# Patient Record
Sex: MALE | Race: WHITE | NOT HISPANIC OR LATINO | Employment: OTHER | URBAN - METROPOLITAN AREA
[De-identification: names, ages, dates, MRNs, and addresses within clinical notes are randomized per-mention and may not be internally consistent; named-entity substitution may affect disease eponyms.]

---

## 2018-05-07 ENCOUNTER — OFFICE VISIT (OUTPATIENT)
Dept: SURGERY | Facility: CLINIC | Age: 58
End: 2018-05-07
Payer: COMMERCIAL

## 2018-05-07 ENCOUNTER — TELEPHONE (OUTPATIENT)
Dept: ENDOSCOPY | Facility: HOSPITAL | Age: 58
End: 2018-05-07

## 2018-05-07 VITALS
HEART RATE: 69 BPM | BODY MASS INDEX: 33.54 KG/M2 | SYSTOLIC BLOOD PRESSURE: 135 MMHG | HEIGHT: 69 IN | WEIGHT: 226.44 LBS | DIASTOLIC BLOOD PRESSURE: 81 MMHG

## 2018-05-07 DIAGNOSIS — Z86.010 HISTORY OF COLON POLYPS: Primary | ICD-10-CM

## 2018-05-07 DIAGNOSIS — K64.8 INTERNAL HEMORRHOIDS WITH COMPLICATION: ICD-10-CM

## 2018-05-07 PROCEDURE — 99203 OFFICE O/P NEW LOW 30 MIN: CPT | Mod: 25,S$GLB,, | Performed by: COLON & RECTAL SURGERY

## 2018-05-07 PROCEDURE — 99999 PR PBB SHADOW E&M-EST. PATIENT-LVL III: CPT | Mod: PBBFAC,,, | Performed by: COLON & RECTAL SURGERY

## 2018-05-07 PROCEDURE — 3008F BODY MASS INDEX DOCD: CPT | Mod: CPTII,S$GLB,, | Performed by: COLON & RECTAL SURGERY

## 2018-05-07 RX ORDER — METOPROLOL SUCCINATE 25 MG/1
TABLET, EXTENDED RELEASE ORAL
COMMUNITY
End: 2018-05-07

## 2018-05-07 RX ORDER — METHYLPREDNISOLONE 4 MG/1
TABLET ORAL
COMMUNITY
End: 2018-08-30

## 2018-05-07 RX ORDER — FLECAINIDE ACETATE 50 MG/1
TABLET ORAL
COMMUNITY
End: 2018-05-07

## 2018-05-07 RX ORDER — TOBRAMYCIN AND DEXAMETHASONE 3; 1 MG/ML; MG/ML
SUSPENSION/ DROPS OPHTHALMIC
Refills: 0 | COMMUNITY
Start: 2018-02-12 | End: 2019-07-02

## 2018-05-08 ENCOUNTER — TELEPHONE (OUTPATIENT)
Dept: ENDOSCOPY | Facility: HOSPITAL | Age: 58
End: 2018-05-08

## 2018-05-08 DIAGNOSIS — Z12.11 SPECIAL SCREENING FOR MALIGNANT NEOPLASMS, COLON: Primary | ICD-10-CM

## 2018-05-08 RX ORDER — SODIUM, POTASSIUM,MAG SULFATES 17.5-3.13G
1 SOLUTION, RECONSTITUTED, ORAL ORAL ONCE
Qty: 1 BOTTLE | Refills: 0 | Status: SHIPPED | OUTPATIENT
Start: 2018-05-08 | End: 2018-05-08

## 2018-05-08 RX ORDER — FLECAINIDE ACETATE 50 MG/1
50 TABLET ORAL EVERY 12 HOURS
COMMUNITY
End: 2019-07-02

## 2018-05-08 NOTE — TELEPHONE ENCOUNTER
Patient called and rescheduled colonoscopy on 7/5/18 @8:30am. Suprep instructions reviewed with patient. Patient verbalized understanding. Written instructions e-mailed to [email or web address] per patient request.

## 2018-05-08 NOTE — PROGRESS NOTES
Subjective:       Patient ID: Gonzalez Lockhart is a 57 y.o. male.    Chief Complaint: Hemorrhoids and Rectal Pain    HPI New pt.  Intermittent hemorrhoidal symptoms of prolapse and bleeding.  Here to discuss treatment options.  Last cscope 2012 - adenoma.    Inconsistent fiber.     Past Medical History:   Diagnosis Date    A-fib     Vitamin D deficiency      History reviewed. RUBA, 2007 - c/b abscess requiring drainage and curettage.     Review of Systems   Constitutional: Negative for chills and fever.   Respiratory: Negative for cough and shortness of breath.    Cardiovascular: Negative for chest pain and palpitations.   Gastrointestinal: Negative for nausea and vomiting.   Genitourinary: Negative for dysuria and urgency.   Neurological: Negative for seizures and numbness.       Objective:      Physical Exam   Constitutional: He is oriented to person, place, and time. He appears well-developed and well-nourished.   Eyes: Conjunctivae and EOM are normal.   Pulmonary/Chest: Effort normal. No respiratory distress.   Genitourinary:   Genitourinary Comments: Anorectal Exam:     Perianal skin: normal    CARINE: Normal resting and squeeze tone.  No masses. Nontender. Prostate normal.     Anoscopy:  Normal distal rectal mucosa. Internal hemorrhoids with stigmata of bleeding or prolapse.      Musculoskeletal: Normal range of motion. He exhibits no edema.   Neurological: He is alert and oriented to person, place, and time.   Skin: Skin is warm and dry.       Assessment:       1. History of colon polyps    2. Internal hemorrhoids with complication        Plan:       Colonoscopy for history of adenoma.   RBL for internal hemorrhoids with complications.

## 2018-06-08 ENCOUNTER — TELEPHONE (OUTPATIENT)
Dept: ENDOSCOPY | Facility: HOSPITAL | Age: 58
End: 2018-06-08

## 2018-06-08 NOTE — TELEPHONE ENCOUNTER
Called patient regarding upcoming procedure. No answer. Left voicemail with direct number to call back.

## 2018-06-25 DIAGNOSIS — Z12.11 SPECIAL SCREENING FOR MALIGNANT NEOPLASMS, COLON: Primary | ICD-10-CM

## 2018-06-25 RX ORDER — SODIUM, POTASSIUM,MAG SULFATES 17.5-3.13G
1 SOLUTION, RECONSTITUTED, ORAL ORAL ONCE
Qty: 1 BOTTLE | Refills: 0 | Status: SHIPPED | OUTPATIENT
Start: 2018-06-25 | End: 2018-06-25

## 2018-07-05 ENCOUNTER — ANESTHESIA (OUTPATIENT)
Dept: ENDOSCOPY | Facility: HOSPITAL | Age: 58
End: 2018-07-05
Payer: COMMERCIAL

## 2018-07-05 ENCOUNTER — ANESTHESIA EVENT (OUTPATIENT)
Dept: ENDOSCOPY | Facility: HOSPITAL | Age: 58
End: 2018-07-05
Payer: COMMERCIAL

## 2018-07-05 ENCOUNTER — SURGERY (OUTPATIENT)
Age: 58
End: 2018-07-05

## 2018-07-05 ENCOUNTER — HOSPITAL ENCOUNTER (OUTPATIENT)
Facility: HOSPITAL | Age: 58
Discharge: HOME | End: 2018-07-05
Attending: COLON & RECTAL SURGERY | Admitting: COLON & RECTAL SURGERY
Payer: COMMERCIAL

## 2018-07-05 VITALS
HEART RATE: 55 BPM | TEMPERATURE: 98 F | WEIGHT: 223 LBS | OXYGEN SATURATION: 96 % | HEIGHT: 69 IN | RESPIRATION RATE: 20 BRPM | SYSTOLIC BLOOD PRESSURE: 115 MMHG | DIASTOLIC BLOOD PRESSURE: 72 MMHG | BODY MASS INDEX: 33.03 KG/M2

## 2018-07-05 DIAGNOSIS — Z12.11 SCREENING FOR COLON CANCER: ICD-10-CM

## 2018-07-05 DIAGNOSIS — Z86.010 HISTORY OF COLON POLYPS: Primary | ICD-10-CM

## 2018-07-05 PROBLEM — Z86.0100 HISTORY OF COLON POLYPS: Status: ACTIVE | Noted: 2018-07-05

## 2018-07-05 PROCEDURE — 63600175 PHARM REV CODE 636 W HCPCS: Performed by: NURSE ANESTHETIST, CERTIFIED REGISTERED

## 2018-07-05 PROCEDURE — 37000009 HC ANESTHESIA EA ADD 15 MINS: Performed by: COLON & RECTAL SURGERY

## 2018-07-05 PROCEDURE — D9220A PRA ANESTHESIA: Mod: ANES,,, | Performed by: ANESTHESIOLOGY

## 2018-07-05 PROCEDURE — 45385 COLONOSCOPY W/LESION REMOVAL: CPT | Mod: ,,, | Performed by: COLON & RECTAL SURGERY

## 2018-07-05 PROCEDURE — D9220A PRA ANESTHESIA: Mod: CRNA,,, | Performed by: NURSE ANESTHETIST, CERTIFIED REGISTERED

## 2018-07-05 PROCEDURE — 25000003 PHARM REV CODE 250: Performed by: NURSE PRACTITIONER

## 2018-07-05 PROCEDURE — 45385 COLONOSCOPY W/LESION REMOVAL: CPT | Performed by: COLON & RECTAL SURGERY

## 2018-07-05 PROCEDURE — 88305 TISSUE EXAM BY PATHOLOGIST: CPT | Performed by: PATHOLOGY

## 2018-07-05 PROCEDURE — 88305 TISSUE EXAM BY PATHOLOGIST: CPT | Mod: 26,,, | Performed by: PATHOLOGY

## 2018-07-05 PROCEDURE — 25000003 PHARM REV CODE 250: Performed by: COLON & RECTAL SURGERY

## 2018-07-05 PROCEDURE — 46221 LIGATION OF HEMORRHOID(S): CPT | Mod: 51,,, | Performed by: COLON & RECTAL SURGERY

## 2018-07-05 PROCEDURE — 45398 COLONOSCOPY W/BAND LIGATION: CPT | Performed by: COLON & RECTAL SURGERY

## 2018-07-05 PROCEDURE — 46221 LIGATION OF HEMORRHOID(S): CPT | Performed by: COLON & RECTAL SURGERY

## 2018-07-05 PROCEDURE — 27201089 HC SNARE, DISP (ANY): Performed by: COLON & RECTAL SURGERY

## 2018-07-05 PROCEDURE — 37000008 HC ANESTHESIA 1ST 15 MINUTES: Performed by: COLON & RECTAL SURGERY

## 2018-07-05 PROCEDURE — 27201022: Performed by: COLON & RECTAL SURGERY

## 2018-07-05 RX ORDER — SODIUM CHLORIDE 9 MG/ML
INJECTION, SOLUTION INTRAVENOUS CONTINUOUS
Status: DISCONTINUED | OUTPATIENT
Start: 2018-07-05 | End: 2018-07-05 | Stop reason: HOSPADM

## 2018-07-05 RX ORDER — BUPIVACAINE HYDROCHLORIDE 2.5 MG/ML
INJECTION, SOLUTION EPIDURAL; INFILTRATION; INTRACAUDAL
Status: COMPLETED | OUTPATIENT
Start: 2018-07-05 | End: 2018-07-05

## 2018-07-05 RX ORDER — PROPOFOL 10 MG/ML
VIAL (ML) INTRAVENOUS
Status: DISCONTINUED | OUTPATIENT
Start: 2018-07-05 | End: 2018-07-05

## 2018-07-05 RX ORDER — PROPOFOL 10 MG/ML
VIAL (ML) INTRAVENOUS CONTINUOUS PRN
Status: DISCONTINUED | OUTPATIENT
Start: 2018-07-05 | End: 2018-07-05

## 2018-07-05 RX ORDER — LIDOCAINE HCL/PF 100 MG/5ML
SYRINGE (ML) INTRAVENOUS
Status: DISCONTINUED | OUTPATIENT
Start: 2018-07-05 | End: 2018-07-05

## 2018-07-05 RX ADMIN — PROPOFOL 80 MG: 10 INJECTION, EMULSION INTRAVENOUS at 08:07

## 2018-07-05 RX ADMIN — PROPOFOL 200 MCG/KG/MIN: 10 INJECTION, EMULSION INTRAVENOUS at 08:07

## 2018-07-05 RX ADMIN — BUPIVACAINE HYDROCHLORIDE 10 ML: 2.5 INJECTION, SOLUTION EPIDURAL; INFILTRATION; INTRACAUDAL; PERINEURAL at 09:07

## 2018-07-05 RX ADMIN — BUPIVACAINE HYDROCHLORIDE 5 ML: 2.5 INJECTION, SOLUTION EPIDURAL; INFILTRATION; INTRACAUDAL; PERINEURAL at 09:07

## 2018-07-05 RX ADMIN — SODIUM CHLORIDE: 0.9 INJECTION, SOLUTION INTRAVENOUS at 07:07

## 2018-07-05 RX ADMIN — LIDOCAINE HYDROCHLORIDE 50 MG: 20 INJECTION, SOLUTION INTRAVENOUS at 08:07

## 2018-07-05 NOTE — H&P
Endoscopy H&P    Procedure : Colonoscopy      asymptomatic screening exam, internal hemorrhoids, personal history of colon polyps and most recent endoscopic exam 6 years ago      Past Medical History:   Diagnosis Date    A-fib     Vitamin D deficiency      Sedation Problems: NO  History reviewed. No pertinent family history.  Fam Hx of Sedation Problems: NO  Social History     Social History    Marital status:      Spouse name: N/A    Number of children: N/A    Years of education: N/A     Occupational History    Not on file.     Social History Main Topics    Smoking status: Never Smoker    Smokeless tobacco: Never Used    Alcohol use Yes      Comment: Rarely    Drug use: No    Sexual activity: Yes     Partners: Female     Other Topics Concern    Not on file     Social History Narrative    No narrative on file       Review of Systems - Negative except     Respiratory ROS: no cough, shortness of breath, or wheezing  Cardiovascular ROS: no chest pain or dyspnea on exertion  Gastrointestinal ROS: no abdominal pain, change in bowel habits, or black or bloody stools  Musculoskeletal ROS: negative  Neurological ROS: no TIA or stroke symptoms        Physical Exam:  General: well developed, no distress  Head: normocephalic, atraumatic  Airway:  normal oropharynx, airway normal  Neck: supple, symmetrical, trachea midline  Lungs:  clear to auscultation bilaterally and normal respiratory effort  Heart: regular rate and rhythm, S1, S2 normal, no murmur, rub or gallop  Abdomen: soft, non-tender non-distented; bowel sounds normal; no masses,  no organomegaly  Extremities: warm, well perfused       Deep Sedation: Mallampati Score per anesthesia     SedationPlan :Choice     ASA : II

## 2018-07-05 NOTE — DISCHARGE INSTRUCTIONS
Diverticulosis    Diverticulosis means that small pouches have formed in the wall of your large intestine (colon). Most often, this problem causes no symptoms and is common as people age. But the pouches in the colon are at risk of becoming infected. When this happens, the condition is called diverticulitis. Although most people with diverticulosis never develop diverticulitis, it is still not uncommon. Rectal bleeding can also occur and in less common situations, a type of colon inflammation called colitis.  While most people do not have symptoms, some people with diverticulosis may have:  · Abdominal cramps and pain  · Bloating  · Constipation  · Change in bowel habits  Causes  The exact cause of diverticulosis (and diverticulitis) has not been proved, but a few things are associated with the condition:  · Low-fiber diet  · Constipation  · Lack of exercise  Your healthcare provider will talk with you about how to manage your condition. Diet changes may be all that are needed to help control diverticulosis and prevent progression to diverticulitis. If you develop diverticulitis, you will likely need other treatments.  Home care  You may be told to take fiber supplements daily. Fiber adds bulk to the stool so that it passes through the colon more easily. Stool softeners may be recommended. You may also be given medications for pain relief. Be sure to take all medications as directed.  In the past, people were told to avoid corn, nuts, and seeds. This is no longer necessary.  Follow these guidelines when caring for yourself at home:  · Eat unprocessed foods that are high in fiber. Whole grains, fruits, and vegetables are good choices.  · Drink 6 to 8 glasses of water every day unless your healthcare provider has you limit how much fluid you should have.  · Watch for changes in your bowel movements. Tell your provider if you notice any changes.  · Begin an exercise program. Ask your provider how to get started.  Generally, walking is the best.  · Get plenty of rest and sleep.  Follow-up care  Follow up with your healthcare provider, or as advised. Regular visits may be needed to check on your health. Sometimes special procedures such as colonoscopy, are needed after an episode of diverticulitis or blooding. Be sure to keep all your appointments.  If a stool sample was taken, or cultures were done, you should be told if they are positive, or if your treatment needs to be changed. You can call as directed for the results.  If X-rays were done, a radiologist will look at them. You will be told if there is a change in your treatment.  If antibiotics were prescribed, be sure to finish them all.  When to seek medical advice  Call your healthcare provider right away if any of these occur:  · Fever of 100.4°F (38°C) or higher, or as directed by your healthcare provider  · Severe cramps in the lower left side of the abdomen or pain that is getting worse  · Tenderness in the lower left side of the abdomen or worsening pain throughout the abdomen  · Diarrhea or constipation that doesn't get better within 24 hours  · Nausea and vomiting  · Bleeding from the rectum  Call 911  Call emergency services if any of the following occur:  · Trouble breathing  · Confusion  · Very drowsy or trouble awakening  · Fainting or loss of consciousness  · Rapid heart rate  · Chest pain  Date Last Reviewed: 12/30/2015 © 2000-2017 [employer]. [address] [place] [address]. All rights reserved. This information is not intended as a substitute for professional medical care. Always follow your healthcare professional's instructions.        Understanding Colon and Rectal Polyps    The colon (also called the large intestine) is a muscular tube that forms the last part of the digestive tract. It absorbs water and stores food waste. The colon is about 4 to 6 feet long. The rectum is the last 6 inches of the colon. The colon and rectum  have a smooth lining composed of millions of cells. Changes in these cells can lead to growths in the colon that can become cancerous and should be removed. Multiple tests are available to screen for colon cancer, but the colonoscopy is the most recommended test. During colonoscopy, these polyps can be removed. How often you need this test depends on many things including your condition, your family history, symptoms, and what the findings were at the previous colonoscopy.   When the colon lining changes  Changes that happen in the cells that line the colon or rectum can lead to growths called polyps. Over a period of years, polyps can turn cancerous. Removing polyps early may prevent cancer from ever forming.  Polyps  Polyps are fleshy clumps of tissue that form on the lining of the colon or rectum. Small polyps are usually benign (not cancerous). However, over time, cells in a polyp can change and become cancerous. Certain types of polyps known as adenomatous polyps are premalignant. The risk for invasive cancer increases with the size of the polyp and certain cell and gene features. This means that they can become cancerous if they're not removed. Hyperplastic polyps are benign. They can grow quite large and not turn cancerous.   Cancer  Almost all colorectal cancers start when polyp cells begin growing abnormally. As a cancerous tumor grows, it may involve more and more of the colon or rectum. In time, cancer can also grow beyond the colon or rectum and spread to nearby organs or to glands called lymph nodes. The cells can also travel to other parts of the body. This is known as metastasis. The earlier a cancerous tumor is removed, the better the chance of preventing its spread.    Date Last Reviewed: 8/1/2016  © 8452-1735 [employer]. [address] [place] [address], [place] [address]. All rights reserved. This information is not intended as a substitute for professional medical care. Always follow your  healthcare professional's instructions.        Eating a High-Fiber Diet  Fiber is what gives strength and structure to plants. Most grains, beans, vegetables, and fruits contain fiber. Foods rich in fiber are often low in calories and fat, and they fill you up more. They may also reduce your risks for certain health problems. To find out the amount of fiber in canned, packaged, or frozen foods, read the Nutrition Facts label. It tells you how much fiber is in a serving.    Types of fiber and their benefits  There are two types of fiber: insoluble and soluble. They both aid digestion and help you maintain a healthy weight.  · Insoluble fiber. This is found in whole grains, cereals, certain fruits and vegetables such as apple skin, corn, and carrots. Insoluble fiber may prevent constipation and reduce the risk for certain types of cancer.  · Soluble fiber. This type of fiber is in oats, beans, and certain fruits and vegetables such as strawberries and peas. Soluble fiber can reduce cholesterol, which may help lower the risk for heart disease. It also helps control blood sugar levels.  Look for high-fiber foods  Try these foods to add fiber to your diet:  · Whole-grain breads and cereals. Try to eat 6 to 8 ounces a day. Include wheat and oat bran cereals, whole-wheat muffins or toast, and corn tortillas in your meals.  · Fruits. Try to eat 2 cups a day. Apples, oranges, strawberries, pears, and bananas are good sources. (Note: Fruit juice is low in fiber.)  · Vegetables. Try to eat at least 2.5 cups a day. Add asparagus, carrots, broccoli, peas, and corn to your meals.  · Beans. One cup of cooked lentils gives you over 15 grams of fiber. Try navy beans, lentils, and chickpeas.  · Seeds. A small handful of seeds gives you about 3 grams of fiber. Try sunflower seeds.  Keep track of your fiber  Keep track of how much fiber you eat. Start by reading food labels. Then eat a variety of foods high in fiber. As you begin to  eat more fiber, ask your healthcare provider how much water you should be drinking to keep your digestive system working smoothly.  You should aim for a certain amount of fiber in your diet each day. If you are a woman, that amount is between 25 and 28 grams per day. Men should aim for 30 to 33 grams per day. After age 50, your daily fiber needs drop to 22 grams for women and 28 grams for men.  Before you reach for the fiber supplements, think about this. Fiber is found naturally in healthy whole foods. It gives you that feeling of fullness after you eat. Taking fiber supplements or eating fiber-enriched foods will not give you this full feeling.  Your fiber intake is a good measure for the quality of your overall diet. If you are missing out on your daily amount of fiber, you may be lacking other important nutrients as well.  Date Last Reviewed: 5/11/2015  © 0927-2187 [employer]. [address] [place] [address]. All rights reserved. This information is not intended as a substitute for professional medical care. Always follow your healthcare professional's instructions.        Colonoscopy     A camera attached to a flexible tube with a viewing lens is used to take video pictures.     Colonoscopy is a test to view the inside of your lower digestive tract (colon and rectum). Sometimes it can show the last part of the small intestine (ileum). During the test, small pieces of tissue may be removed for testing. This is called a biopsy. Small growths, such as polyps, may also be removed.   Why is colonoscopy done?  The test is done to help look for colon cancer. And it can help find the source of abdominal pain, bleeding, and changes in bowel habits. It may be needed once a year, depending on factors such as your:  · Age  · Health history  · Family health history  · Symptoms  · Results from any prior colonoscopy  Risks and possible complications  These include:  · Bleeding               · A  puncture or tear in the colon   · Risks of anesthesia  · A cancer lesion not being seen  Getting ready   To prepare for the test:  · Talk with your healthcare provider about the risks of the test (see below). Also ask your healthcare provider about alternatives to the test.  · Tell your healthcare provider about any medicines you take. Also tell him or her about any health conditions you may have.  · Make sure your rectum and colon are empty for the test. Follow the diet and bowel prep instructions exactly. If you dont, the test may need to be rescheduled.  · Plan for a friend or family member to drive you home after the test.     Colonoscopy provides an inside view of the entire colon.     You may discuss the results with your doctor right away or at a future visit.  During the test   The test is usually done in the hospital on an outpatient basis. This means you go home the same day. The procedure takes about 30 minutes. During that time:  · You are given relaxing (sedating) medicine through an IV line. You may be drowsy, or fully asleep.  · The healthcare provider will first give you a physical exam to check for anal and rectal problems.  · Then the anus is lubricated and the scope inserted.  · If you are awake, you may have a feeling similar to needing to have a bowel movement. You may also feel pressure as air is pumped into the colon. Its OK to pass gas during the procedure.  · Biopsy, polyp removal, or other treatments may be done during the test.  After the test   You may have gas right after the test. It can help to try to pass it to help prevent later bloating. Your healthcare provider may discuss the results with you right away. Or you may need to schedule a follow-up visit to talk about the results. After the test, you can go back to your normal eating and other activities. You may be tired from the sedation and need to rest for a few hours.  Date Last Reviewed: 11/1/2016  © 8195-4037 [employer]. [address] [place] [address], [place] [address]. All rights reserved. This information is not intended as a substitute for professional medical care. Always follow your healthcare professional's instructions.

## 2018-07-05 NOTE — ANESTHESIA PREPROCEDURE EVALUATION
07/05/2018  Gonzalez Lockhart is a 57 y.o., male.    Anesthesia Evaluation    I have reviewed the Patient Summary Reports.     I have reviewed the Medications.     Review of Systems  Anesthesia Hx:  No problems with previous Anesthesia Denies Hx of Anesthetic complications  Neg history of prior surgery. Denies Family Hx of Anesthesia complications.   Denies Personal Hx of Anesthesia complications.   Social:  Non-Smoker    Hematology/Oncology:  Hematology Normal   Oncology Normal     EENT/Dental:EENT/Dental Normal   Cardiovascular:  Cardiovascular Normal Exercise tolerance: good  ECG has been reviewed. Hx Afib   Pulmonary:  Pulmonary Normal    Renal/:  Renal/ Normal     Hepatic/GI:   GERD    Musculoskeletal:  Musculoskeletal Normal    Neurological:  Neurology Normal    Endocrine:  Endocrine Normal    Dermatological:  Skin Normal    Psych:  Psychiatric Normal           Physical Exam  General:  Well nourished    Airway/Jaw/Neck:  Airway Findings: Mouth Opening: Normal Tongue: Normal  General Airway Assessment: Adult  Mallampati: II  TM Distance: Normal, at least 6 cm  Jaw/Neck Findings:  Micrognathia: Negative Mandibular Fracture: Negative    Neck ROM: Normal ROM  Neck Findings: Normal    Eyes/Ears/Nose:  EYES/EARS/NOSE FINDINGS: Normal   Dental:  Dental Findings: In tact   Chest/Lungs:  Chest/Lungs Findings: Clear to auscultation, Normal Respiratory Rate     Heart/Vascular:  Heart Findings: Rate: Normal  Rhythm: Regular Rhythm  Sounds: Normal  Heart murmur: negative Vascular Findings: Normal    Abdomen:  Abdomen Findings:  Normal, Soft, Nontender     Musculoskeletal:  Musculoskeletal Findings: Normal   Skin:  Skin Findings: Normal    Mental Status:  Mental Status Findings:  Cooperative, Alert and Oriented         Anesthesia Plan  Type of Anesthesia, risks & benefits discussed:  Anesthesia Type:   general  Patient's Preference:   Intra-op Monitoring Plan: standard ASA monitors  Intra-op Monitoring Plan Comments:   Post Op Pain Control Plan:   Post Op Pain Control Plan Comments:   Induction:   IV  Beta Blocker:  Patient is on a Beta-Blocker and has received one dose within the past 24 hours (No further documentation required).       Informed Consent: Patient understands risks and agrees with Anesthesia plan.  Questions answered. Anesthesia consent signed with patient.  ASA Score: 2     Day of Surgery Review of History & Physical:    H&P update referred to the provider.         Ready For Surgery From Anesthesia Perspective.

## 2018-07-05 NOTE — ANESTHESIA POSTPROCEDURE EVALUATION
"Anesthesia Post Evaluation    Patient: Gonzalez Lockhart    Procedure(s) Performed: Procedure(s) (LRB):  COLONOSCOPY; rubber band ligation of hemorrhoids (N/A)    Final Anesthesia Type: general  Patient location during evaluation: GI PACU  Patient participation: Yes- Able to Participate  Level of consciousness: awake and alert  Post-procedure vital signs: reviewed and stable  Pain management: adequate  Airway patency: patent  PONV status at discharge: No PONV  Anesthetic complications: no      Cardiovascular status: blood pressure returned to baseline  Respiratory status: unassisted  Hydration status: euvolemic  Follow-up not needed.        Visit Vitals  /72   Pulse (!) 55   Temp 36.4 °C (97.5 °F)   Resp 20   Ht 5' 9" (1.753 m)   Wt 101.2 kg (223 lb)   SpO2 96%   BMI 32.93 kg/m²       Pain/Mckenna Score: Pain Assessment Performed: Yes (7/5/2018  9:50 AM)  Presence of Pain: denies (7/5/2018  9:50 AM)  Mckenna Score: 10 (7/5/2018  9:50 AM)      "

## 2018-07-05 NOTE — [employer] PATIENT INSTRUCTIONS
Discharge Summary/Instructions after an Endoscopic Procedure  Patient Name: Gonzalez Lockhart  Patient MRN: [id number]  Patient YOB: 1960 Thursday, July 05, 2018  Kennedy Marr MD  RESTRICTIONS:  During your procedure today, you received medications for sedation.  These   medications may affect your judgment, balance and coordination.  Therefore,   for 24 hours, you have the following restrictions:   - DO NOT drive a car, operate machinery, make legal/financial decisions,   sign important papers or drink alcohol.    ACTIVITY:  Today: no heavy lifting, straining or running due to procedural   sedation/anesthesia.  The following day: return to full activity including work.  DIET:  Eat and drink normally unless instructed otherwise.     TREATMENT FOR COMMON SIDE EFFECTS:  - Mild abdominal pain, nausea, belching, bloating or excessive gas:  rest,   eat lightly and use a heating pad.  - Sore Throat: treat with throat lozenges and/or gargle with warm salt   water.  - Because air was used during the procedure, expelling large amounts of air   from your rectum or belching is normal.  - If a bowel prep was taken, you may not have a bowel movement for 1-3 days.    This is normal.  SYMPTOMS TO WATCH FOR AND REPORT TO YOUR PHYSICIAN:  1. Abdominal pain or bloating, other than gas cramps.  2. Chest pain.  3. Back pain.  4. Signs of infection such as: chills or fever occurring within 24 hours   after the procedure.  5. Rectal bleeding, which would show as bright red, maroon, or black stools.   (A tablespoon of blood from the rectum is not serious, especially if   hemorrhoids are present.)  6. Vomiting.  7. Weakness or dizziness.  GO DIRECTLY TO THE NEAREST EMERGENCY ROOM IF YOU HAVE ANY OF THE FOLLOWING:      Difficulty breathing              Chills and/or fever over 101 F   Persistent vomiting and/or vomiting blood   Severe abdominal pain   Severe chest pain   Black, tarry stools   Bleeding- more than one  tablespoon   Any other symptom or condition that you feel may need urgent attention  Your doctor recommends these additional instructions:  If any biopsies were taken, your doctors clinic will contact you in 1 to 2   weeks with any results.  - Discharge patient to home (ambulatory).   - Resume previous diet.   - Continue present medications.   - Await pathology results.   - Repeat colonoscopy in 5 years for adenoma surveillance.  For questions, problems or results please call your physician - Kennedy Marr MD at Work:  [phone].  [facility], EMERGENCY ROOM PHONE NUMBER: [phone]  IF A COMPLICATION OR EMERGENCY SITUATION ARISES AND YOU ARE UNABLE TO REACH   YOUR PHYSICIAN - GO DIRECTLY TO THE EMERGENCY ROOM.  Kennedy Marr MD  7/5/2018 9:18:40 AM  This report has been verified and signed electronically.  [employer]

## 2018-07-05 NOTE — TRANSFER OF CARE
"Anesthesia Transfer of Care Note    Patient: Gonzalez Lockhart    Procedure(s) Performed: Procedure(s) (LRB):  COLONOSCOPY; rubber band ligation of hemorrhoids (N/A)    Patient location: GI    Anesthesia Type: general    Transport from OR: Transported from OR on 6-10 L/min O2 by face mask with adequate spontaneous ventilation    Post pain: adequate analgesia    Post assessment: no apparent anesthetic complications and tolerated procedure well    Post vital signs: stable    Level of consciousness: alert, awake and oriented    Nausea/Vomiting: no nausea/vomiting    Complications: none    Transfer of care protocol was followed      Last vitals:   Visit Vitals  BP (!) 116/59   Pulse 66   Temp 36.4 °C (97.5 °F)   Resp 18   Ht 5' 9" (1.753 m)   Wt 101.2 kg (223 lb)   SpO2 (!) 94%   BMI 32.93 kg/m²     "

## 2018-07-12 ENCOUNTER — TELEPHONE (OUTPATIENT)
Dept: ENDOSCOPY | Facility: HOSPITAL | Age: 58
End: 2018-07-12

## 2018-08-30 ENCOUNTER — OFFICE VISIT (OUTPATIENT)
Dept: ELECTROPHYSIOLOGY | Facility: CLINIC | Age: 58
End: 2018-08-30
Payer: COMMERCIAL

## 2018-08-30 ENCOUNTER — HOSPITAL ENCOUNTER (OUTPATIENT)
Dept: CARDIOLOGY | Facility: CLINIC | Age: 58
Discharge: HOME | End: 2018-08-30
Payer: COMMERCIAL

## 2018-08-30 VITALS
WEIGHT: 232.38 LBS | DIASTOLIC BLOOD PRESSURE: 82 MMHG | BODY MASS INDEX: 34.42 KG/M2 | HEIGHT: 69 IN | SYSTOLIC BLOOD PRESSURE: 126 MMHG | HEART RATE: 65 BPM

## 2018-08-30 DIAGNOSIS — I48.0 PAF (PAROXYSMAL ATRIAL FIBRILLATION): ICD-10-CM

## 2018-08-30 DIAGNOSIS — I48.0 PAROXYSMAL ATRIAL FIBRILLATION: Primary | ICD-10-CM

## 2018-08-30 PROCEDURE — 93000 ELECTROCARDIOGRAM COMPLETE: CPT | Mod: S$GLB,,, | Performed by: INTERNAL MEDICINE

## 2018-08-30 PROCEDURE — 99205 OFFICE O/P NEW HI 60 MIN: CPT | Mod: S$GLB,,, | Performed by: INTERNAL MEDICINE

## 2018-08-30 PROCEDURE — 99999 PR PBB SHADOW E&M-EST. PATIENT-LVL III: CPT | Mod: PBBFAC,,, | Performed by: INTERNAL MEDICINE

## 2018-08-30 PROCEDURE — 3008F BODY MASS INDEX DOCD: CPT | Mod: CPTII,S$GLB,, | Performed by: INTERNAL MEDICINE

## 2018-08-30 NOTE — PROGRESS NOTES
Subjective:    Patient ID:  Gonzalez Lockhart is a 58 y.o. male who presents for evaluation of Atrial Fibrillation      HPI  I had the pleasure of seeing Mr. Lockhart in our electrophysiology clinic in consultation for his atrial fibrillation. As you are aware he is a pleasant 58 year-old man with symptomatic paroxysmal atrial fibrillation treated with flecainide and chronic idiopathic angioedema. He works in [place] however lives here in [place]. He began having periodic palpitations in 2015. He saw a cardiologist in [place] who ordered a 30 day monitor. While wearing it here he had an episode and went to [facility]. He was diagnosed with AF with RVR. He converted spontaneously and was initiated on lopressor. He saw an electrophysiologist in [place] who started him on flecainide. This kept him in sinus rhythm for a few years. However for the past 6 months he has had increasing paroxysms of AF with rates up to 130-150bpm and lasting 6-7 hours before converting. His electrophysiologist recommended a PVI. The patient is requesting a PVI, in particular cryo-PVI and would like someone that does a high-volume of them. He is not on anticoagulation or aspirin.    I reviewed available ECGs in Epic. An ECG dated 8/23/2015 shows AF with RVR. All other ECGs show sinus rhythm.    My interpretation of today's in clinic ECG is sinus rhythm with a ventricular rate of 65 bpm.    Review of Systems   Constitution: Negative for fever, weakness and malaise/fatigue.   HENT: Negative for congestion and sore throat.    Eyes: Negative for blurred vision and visual disturbance.   Cardiovascular: Positive for irregular heartbeat and palpitations. Negative for chest pain, dyspnea on exertion, leg swelling, near-syncope, orthopnea and paroxysmal nocturnal dyspnea.   Respiratory: Negative for cough and shortness of breath.    Hematologic/Lymphatic: Negative for bleeding problem. Does not bruise/bleed easily.   Skin: Negative.     Musculoskeletal: Negative.    Gastrointestinal: Negative for bloating and abdominal pain.   Neurological: Negative for dizziness and focal weakness.        Objective:    Physical Exam   Constitutional: He is oriented to person, place, and time. He appears well-developed and well-nourished. No distress.   HENT:   Head: Normocephalic and atraumatic.   Eyes: Right eye exhibits no discharge. Left eye exhibits no discharge.   Neck: Neck supple. No JVD present.   Cardiovascular: Normal rate and regular rhythm. Exam reveals no gallop and no friction rub.   No murmur heard.  Pulmonary/Chest: Effort normal and breath sounds normal. No respiratory distress. He has no wheezes. He has no rales.   Abdominal: Soft. Bowel sounds are normal. He exhibits no distension. There is no tenderness. There is no rebound.   Musculoskeletal: He exhibits no edema.   Neurological: He is alert and oriented to person, place, and time.   Skin: Skin is warm and dry. He is not diaphoretic.   Psychiatric: He has a normal mood and affect. His behavior is normal. Judgment and thought content normal.   Vitals reviewed.        Assessment:       1. Paroxysmal atrial fibrillation         Plan:       In summary, Mr. Lockhart is a 58 year-old man with symptomatic paroxysmal atrial fibrillation who is failing flecainide. He is requesting cryo-PVI. I spent about a half hour discussing the nature of EP study and ablation, including transseptal puncture. We discussed risks and benefits at length. Our discussion included, but was not limited to the risk of death, infection, bleeding, stroke, MI, cardiac perforation, vascular injury, embolism, cardiac tamponade, skin burns, and other organic injury including the possibility for need for surgery or pacemaker implantation. His [id number] score is 0. Would recommend initiation of anticoagulation 2 weeks prior and then at least 8 weeks after.    Mr. Lockhart desires to discuss this with someone who has been in practice  longer and has a higher cryo-PVI volume than myself. I recommend he see my partner, Dr. Dalton Lord. In the interim would get a 2d echo and records from [place].    Thank you for allowing me to participate in the care of this patient. Please do not hesitate to call me with any questions or concerns.    Praneeth Dalton MD, PhD  Cardiac Electrophysiology

## 2018-09-05 DIAGNOSIS — I48.0 PAF (PAROXYSMAL ATRIAL FIBRILLATION): Primary | ICD-10-CM

## 2019-07-02 ENCOUNTER — OFFICE VISIT (OUTPATIENT)
Dept: SURGERY | Facility: CLINIC | Age: 59
End: 2019-07-02
Payer: COMMERCIAL

## 2019-07-02 VITALS
SYSTOLIC BLOOD PRESSURE: 120 MMHG | DIASTOLIC BLOOD PRESSURE: 75 MMHG | HEIGHT: 69 IN | BODY MASS INDEX: 35.04 KG/M2 | HEART RATE: 57 BPM | WEIGHT: 236.56 LBS

## 2019-07-02 DIAGNOSIS — K64.1 GRADE II HEMORRHOIDS: Primary | ICD-10-CM

## 2019-07-02 PROCEDURE — 3008F BODY MASS INDEX DOCD: CPT | Mod: CPTII,S$GLB,, | Performed by: COLON & RECTAL SURGERY

## 2019-07-02 PROCEDURE — 3008F PR BODY MASS INDEX (BMI) DOCUMENTED: ICD-10-PCS | Mod: CPTII,S$GLB,, | Performed by: COLON & RECTAL SURGERY

## 2019-07-02 PROCEDURE — 99213 OFFICE O/P EST LOW 20 MIN: CPT | Mod: 25,S$GLB,, | Performed by: COLON & RECTAL SURGERY

## 2019-07-02 PROCEDURE — 99999 PR PBB SHADOW E&M-EST. PATIENT-LVL III: ICD-10-PCS | Mod: PBBFAC,,, | Performed by: COLON & RECTAL SURGERY

## 2019-07-02 PROCEDURE — 46221 LIGATION OF HEMORRHOID(S): CPT | Mod: S$GLB,,, | Performed by: COLON & RECTAL SURGERY

## 2019-07-02 PROCEDURE — 99213 PR OFFICE/OUTPT VISIT, EST, LEVL III, 20-29 MIN: ICD-10-PCS | Mod: 25,S$GLB,, | Performed by: COLON & RECTAL SURGERY

## 2019-07-02 PROCEDURE — 46221 PR HEMORRHOIDECTOMY INTERNAL RUBBER BAND LIGATIONS: ICD-10-PCS | Mod: S$GLB,,, | Performed by: COLON & RECTAL SURGERY

## 2019-07-02 PROCEDURE — 99999 PR PBB SHADOW E&M-EST. PATIENT-LVL III: CPT | Mod: PBBFAC,,, | Performed by: COLON & RECTAL SURGERY

## 2019-07-02 RX ORDER — ASPIRIN 325 MG
TABLET, DELAYED RELEASE (ENTERIC COATED) ORAL
Refills: 0 | COMMUNITY
Start: 2019-04-25

## 2019-07-02 RX ORDER — METOPROLOL SUCCINATE 25 MG/1
TABLET, EXTENDED RELEASE ORAL
Refills: 0 | COMMUNITY
Start: 2019-06-27 | End: 2023-10-02

## 2019-07-02 RX ORDER — APIXABAN 5 MG/1
TABLET, FILM COATED ORAL
Refills: 3 | COMMUNITY
Start: 2019-06-27 | End: 2023-10-17 | Stop reason: SDUPTHER

## 2019-07-02 RX ORDER — FLECAINIDE ACETATE 100 MG/1
TABLET ORAL
Refills: 3 | COMMUNITY
Start: 2019-04-20 | End: 2023-10-17 | Stop reason: SDUPTHER

## 2019-07-02 NOTE — PROGRESS NOTES
CRS Office Visit Follow-up  Referring Md:   Aaareferral Self  No address on file    SUBJECTIVE:     Chief Complaint: hemorrhoids    History of Present Illness:  Patient is a 58 y.o. male presents with hemorrhoids. The patient is a established patient to this practice.     Course is as follows:  7/5/18:  Rubber band ligation of grade II hemorrhoids during CSY (left lateral and right anterior)    Current status:  He is overall doing well until 6 months ago.  At that point time, he developed recurrent bleeding as well as increase in straining with his bowel movements.  His main complaints today are intermittent pain as well as bleeding with bowel movements.  He is otherwise doing well. He does spend a prolonged amount of time sitting on the toilet for each bowel movement.    Last Colonoscopy: 7/5/2018  Impression:           - One 6 mm polyp in the transverse colon, removed                         with a cold snare. Resected and retrieved.                        - One 4 mm polyp in the descending colon, removed                         with a cold snare. Resected and retrieved.                        - Diverticulosis in the sigmoid colon.                        - Non-bleeding internal hemorrhoids. Banded.                        - The examination was otherwise normal on direct                         and retroflexion views.                        - Large lipoma in the transverse colon.  Pathology: Tubular adenoma x 2    Review of Systems:  Review of Systems   Constitutional: Negative for chills, diaphoresis, fever, malaise/fatigue and weight loss.   HENT: Negative for congestion.    Respiratory: Negative for shortness of breath.    Cardiovascular: Negative for chest pain and leg swelling.   Gastrointestinal: Positive for blood in stool. Negative for abdominal pain, constipation, nausea and vomiting.   Genitourinary: Negative for dysuria.   Musculoskeletal: Negative for back pain and myalgias.   Skin: Negative for rash.  "  Neurological: Negative for dizziness and weakness.   Endo/Heme/Allergies: Does not bruise/bleed easily.   Psychiatric/Behavioral: Negative for depression.       OBJECTIVE:     Vital Signs (Most Recent)  /75 (BP Location: Left arm, Patient Position: Sitting, BP Method: Large (Automatic))   Pulse (!) 57   Ht 5' 9" (1.753 m)   Wt 107.3 kg (236 lb 8.9 oz)   BMI 34.93 kg/m²     Physical Exam:  General: White male in no distress   Neuro: alert and oriented x 4.  Moves all extremities.     HEENT: no icterus.  Trachea midline  Respiratory: respirations are even and unlabored  Cardiac: regular rate  Abdomen:  Soft, nontender, no masses  Extremities: Warm dry and intact  Skin: no rashes  Anorectal:  External exam demonstrates small skin tag on the left lateral side.  Digital exam performed. Normal tone.  Small internal hemorrhoids palpated.  Anoscopy was then performed. Grade 1 internal hemorrhoid in the right anterior position and grade 2 hemorrhoid in the left lateral position.    Labs: none    Imaging: none      ASSESSMENT/PLAN:     Gonzalez BARILLAS was seen today for microbanding of hemorrhiods'.    Diagnoses and all orders for this visit:    Grade II hemorrhoids        58-year-old gentleman with grade 2 internal hemorrhoids.  Rubber-band ligation was recommended and performed today without complication.  Rubber-band ligation of right anterior and left lateral hemorrhoid.  Return to clinic as needed      Rubber Band Ligation:  Verbal consent was obtained.   Clear plastic anoscope inserted.    Grade I hemorrhoids seen on the right anterior position and grade II in the left lateral position.  Suction applicator was placed above the dentate line.   Rubber bands were deployed in the right anterior and left lateral position.    Patient tolerated the procedure well.     ROSE MARIE Marr MD  Staff Surgeon  Colon & Rectal Surgery      "

## 2019-10-28 ENCOUNTER — OFFICE VISIT (OUTPATIENT)
Dept: OPHTHALMOLOGY | Facility: CLINIC | Age: 59
End: 2019-10-28
Payer: COMMERCIAL

## 2019-10-28 DIAGNOSIS — H49.22 SIXTH CRANIAL NERVE PALSY, LEFT: ICD-10-CM

## 2019-10-28 PROCEDURE — 92004 PR EYE EXAM, NEW PATIENT,COMPREHESV: ICD-10-PCS | Mod: S$GLB,,, | Performed by: OPHTHALMOLOGY

## 2019-10-28 PROCEDURE — 92004 COMPRE OPH EXAM NEW PT 1/>: CPT | Mod: S$GLB,,, | Performed by: OPHTHALMOLOGY

## 2019-10-28 PROCEDURE — 99999 PR PBB SHADOW E&M-EST. PATIENT-LVL III: CPT | Mod: PBBFAC,,, | Performed by: OPHTHALMOLOGY

## 2019-10-28 PROCEDURE — 99999 PR PBB SHADOW E&M-EST. PATIENT-LVL III: ICD-10-PCS | Mod: PBBFAC,,, | Performed by: OPHTHALMOLOGY

## 2019-10-28 NOTE — PROGRESS NOTES
HPI     Referred by      Patient states experiencing double side by side x 1 week ago, but notice   last few days gotten worse.  2 on pain scale, but a few days Left temple and eye painful.  Pt states was told it might be sixth nerve palsy.  Wearing older rx, was wearing his new rx x 2-3 week prior to the double   incident.      Notes in media- CT scan and MRI (w/o constrast)- which came back neg. He   was told that he had a left sixth nerve palsy.    I have personally interviewed the patient, reviewed the history and   examined the patient and agree with the technician's exam.    Last edited by Keenan Mcnamara MD on 10/28/2019  4:06 PM. (History)            Assessment /Plan     For exam results, see Encounter Report.    Sixth cranial nerve palsy, left      I prescribed a temporary 20 diopter base out prism for his left eye to allow some range of fusion. I will repeat his exam in two months if the double vision persists.

## 2019-10-28 NOTE — LETTER
[facility] - Ophthalmology  [address]  [facility] [address]  Phone: [phone]  Fax: [phone]   October 28, 2019    Orion Marcus IV, MD  [address] [address]    Patient: Gonzalez Lockhart   MR Number: [id number]   YOB: 1960   Date of Visit: 10/28/2019       Dear Dr. Marcus:    Thank you for referring Gonzalez Lockhart to me for evaluation. Here is my assessment and plan of care:    Assessment:  /Plan     For exam results, see Encounter Report.    Sixth cranial nerve palsy, left      I prescribed a temporary 20 diopter base out prism for his left eye to allow some range of fusion. I will repeat his exam in two months if the double vision persists.          Plan:  For exam results, see Encounter Report.    Sixth cranial nerve palsy, left      I prescribed a temporary 20 diopter base out prism for his left eye to allow some range of fusion. I will repeat his exam in two months if the double vision persists.            Below you will find my full exam findings. If you have questions, please do not hesitate to call me. I look forward to following Mr. Gonzalez Lockhart along with you.    Sincerely,          Keenan Mcnamara MD       CC  No Recipients             Base Eye Exam     Visual Acuity (Snellen - Linear)       Right Left    Dist cc 20/30 +1 20/30 -1+1    Correction:  Glasses          Tonometry (Applanation, 4:10 PM)       Right Left    Pressure 17 17          Pupils       Dark Light Shape React APD    Right 5 3 Round Brisk None    Left 5 3 Round Brisk None          Visual Fields       Right Left     Full Full          Neuro/Psych     Oriented x3:  Yes    Mood/Affect:  Normal          Dilation     Both eyes:  1% Mydriacyl, 2.5% Phenylephrine @ 4:11 PM            Strabismus Exam       - - - - - -   - - - - - -                      LET 4 - -  - -  LET 15 - -  -4  LET 30                     - - - - - -   - - - - - -                Small range of single vision around straight ahead  with 20 diopter base out Fresnel prism over left eye.      Slit Lamp and Fundus Exam     External Exam       Right Left    External Normal Normal          Slit Lamp Exam       Right Left    Lids/Lashes Normal Normal    Conjunctiva/Sclera White and quiet White and quiet    Cornea Clear Clear    Anterior Chamber Deep and quiet Deep and quiet    Iris Round and reactive Round and reactive    Lens Clear Clear    Vitreous Normal Normal          Fundus Exam       Right Left    Disc Normal Normal    C/D Ratio 0.3 0.3    Macula Normal Normal    Vessels Normal Normal    Periphery Normal Normal            Refraction     Final Rx       Horz Prism    Right     Left 20 out    Type:  Fresnel

## 2020-01-10 ENCOUNTER — OFFICE VISIT (OUTPATIENT)
Dept: OPHTHALMOLOGY | Facility: CLINIC | Age: 60
End: 2020-01-10
Payer: COMMERCIAL

## 2020-01-10 DIAGNOSIS — H49.22 SIXTH CRANIAL NERVE PALSY, LEFT: Primary | ICD-10-CM

## 2020-01-10 PROCEDURE — 99999 PR PBB SHADOW E&M-EST. PATIENT-LVL III: ICD-10-PCS | Mod: PBBFAC,,, | Performed by: OPHTHALMOLOGY

## 2020-01-10 PROCEDURE — 92012 INTRM OPH EXAM EST PATIENT: CPT | Mod: S$GLB,,, | Performed by: OPHTHALMOLOGY

## 2020-01-10 PROCEDURE — 99999 PR PBB SHADOW E&M-EST. PATIENT-LVL III: CPT | Mod: PBBFAC,,, | Performed by: OPHTHALMOLOGY

## 2020-01-10 PROCEDURE — 92012 PR EYE EXAM, EST PATIENT,INTERMED: ICD-10-PCS | Mod: S$GLB,,, | Performed by: OPHTHALMOLOGY

## 2020-01-10 RX ORDER — NALTREXONE HYDROCHLORIDE AND BUPROPION HYDROCHLORIDE 8; 90 MG/1; MG/1
TABLET, EXTENDED RELEASE ORAL
Refills: 3 | COMMUNITY
Start: 2019-10-18 | End: 2023-10-02

## 2020-01-10 RX ORDER — LORAZEPAM 1 MG/1
1 TABLET ORAL NIGHTLY PRN
Refills: 0 | COMMUNITY
Start: 2019-11-08

## 2020-01-10 NOTE — PROGRESS NOTES
HPI     Sixth cranial nerve palsy, left follow up. Pt reports still having some   diplopia. Pt feels diplopia gotten better. Pt denies pain. No flashes or   floaters.     I have personally interviewed the patient, reviewed the history and   examined the patient and agree with the technician's exam.    Last edited by Keenan Mcnamara MD on 1/10/2020  4:07 PM. (History)            Assessment /Plan     For exam results, see Encounter Report.    Sixth cranial nerve palsy, left      Mr. Lockhart is recovering as expected. I will repeat his exam in two months if he still has double vision.

## 2021-04-15 ENCOUNTER — PATIENT MESSAGE (OUTPATIENT)
Dept: RESEARCH | Facility: HOSPITAL | Age: 61
End: 2021-04-15

## 2021-04-23 ENCOUNTER — IMMUNIZATION (OUTPATIENT)
Dept: PRIMARY CARE CLINIC | Facility: CLINIC | Age: 61
End: 2021-04-23
Payer: COMMERCIAL

## 2021-04-23 DIAGNOSIS — Z23 NEED FOR VACCINATION: Primary | ICD-10-CM

## 2021-04-23 PROCEDURE — 91300 PR SARS-COV- 2 COVID-19 VACCINE, NO PRSV, 30MCG/0.3ML, IM: ICD-10-PCS | Mod: S$GLB,,, | Performed by: INTERNAL MEDICINE

## 2021-04-23 PROCEDURE — 91300 PR SARS-COV- 2 COVID-19 VACCINE, NO PRSV, 30MCG/0.3ML, IM: CPT | Mod: S$GLB,,, | Performed by: INTERNAL MEDICINE

## 2021-04-23 PROCEDURE — 0001A PR IMMUNIZ ADMIN, SARS-COV-2 COVID-19 VACC, 30MCG/0.3ML, 1ST DOSE: ICD-10-PCS | Mod: CV19,S$GLB,, | Performed by: INTERNAL MEDICINE

## 2021-04-23 PROCEDURE — 0001A PR IMMUNIZ ADMIN, SARS-COV-2 COVID-19 VACC, 30MCG/0.3ML, 1ST DOSE: CPT | Mod: CV19,S$GLB,, | Performed by: INTERNAL MEDICINE

## 2021-04-23 RX ADMIN — Medication 0.3 ML: at 09:04

## 2021-05-20 ENCOUNTER — IMMUNIZATION (OUTPATIENT)
Dept: INTERNAL MEDICINE | Facility: CLINIC | Age: 61
End: 2021-05-20
Payer: COMMERCIAL

## 2021-05-20 DIAGNOSIS — Z23 NEED FOR VACCINATION: Primary | ICD-10-CM

## 2021-05-20 PROCEDURE — 91300 COVID-19, MRNA, LNP-S, PF, 30 MCG/0.3 ML DOSE VACCINE: CPT | Mod: PBBFAC | Performed by: INTERNAL MEDICINE

## 2021-05-20 PROCEDURE — 0002A COVID-19, MRNA, LNP-S, PF, 30 MCG/0.3 ML DOSE VACCINE: CPT | Mod: PBBFAC | Performed by: INTERNAL MEDICINE

## 2022-08-24 ENCOUNTER — OFFICE VISIT (OUTPATIENT)
Dept: INTERNAL MEDICINE | Facility: CLINIC | Age: 62
End: 2022-08-24
Payer: COMMERCIAL

## 2022-08-24 ENCOUNTER — LAB VISIT (OUTPATIENT)
Dept: LAB | Facility: HOSPITAL | Age: 62
End: 2022-08-24
Attending: STUDENT IN AN ORGANIZED HEALTH CARE EDUCATION/TRAINING PROGRAM
Payer: COMMERCIAL

## 2022-08-24 VITALS
RESPIRATION RATE: 18 BRPM | WEIGHT: 232.56 LBS | SYSTOLIC BLOOD PRESSURE: 130 MMHG | TEMPERATURE: 97 F | OXYGEN SATURATION: 99 % | BODY MASS INDEX: 33.29 KG/M2 | HEIGHT: 70 IN | HEART RATE: 64 BPM | DIASTOLIC BLOOD PRESSURE: 76 MMHG

## 2022-08-24 DIAGNOSIS — Z00.00 PHYSICAL EXAM, ANNUAL: ICD-10-CM

## 2022-08-24 DIAGNOSIS — Z00.00 PHYSICAL EXAM, ANNUAL: Primary | ICD-10-CM

## 2022-08-24 DIAGNOSIS — M54.50 CHRONIC BILATERAL LOW BACK PAIN, UNSPECIFIED WHETHER SCIATICA PRESENT: ICD-10-CM

## 2022-08-24 DIAGNOSIS — G89.29 CHRONIC BILATERAL LOW BACK PAIN, UNSPECIFIED WHETHER SCIATICA PRESENT: ICD-10-CM

## 2022-08-24 DIAGNOSIS — G47.33 OBSTRUCTIVE SLEEP APNEA: ICD-10-CM

## 2022-08-24 DIAGNOSIS — I48.0 PAROXYSMAL ATRIAL FIBRILLATION: ICD-10-CM

## 2022-08-24 DIAGNOSIS — R51.9 NONINTRACTABLE HEADACHE, UNSPECIFIED CHRONICITY PATTERN, UNSPECIFIED HEADACHE TYPE: ICD-10-CM

## 2022-08-24 LAB
25(OH)D3+25(OH)D2 SERPL-MCNC: 44 NG/ML (ref 30–96)
ALBUMIN SERPL BCP-MCNC: 4.3 G/DL (ref 3.5–5.2)
ALP SERPL-CCNC: 64 U/L (ref 55–135)
ALT SERPL W/O P-5'-P-CCNC: 25 U/L (ref 10–44)
ANION GAP SERPL CALC-SCNC: 10 MMOL/L (ref 8–16)
AST SERPL-CCNC: 18 U/L (ref 10–40)
BASOPHILS # BLD AUTO: 0.03 K/UL (ref 0–0.2)
BASOPHILS NFR BLD: 0.4 % (ref 0–1.9)
BILIRUB SERPL-MCNC: 0.6 MG/DL (ref 0.1–1)
BUN SERPL-MCNC: 13 MG/DL (ref 8–23)
CALCIUM SERPL-MCNC: 9.7 MG/DL (ref 8.7–10.5)
CHLORIDE SERPL-SCNC: 105 MMOL/L (ref 95–110)
CHOLEST SERPL-MCNC: 114 MG/DL (ref 120–199)
CHOLEST/HDLC SERPL: 2.7 {RATIO} (ref 2–5)
CO2 SERPL-SCNC: 26 MMOL/L (ref 23–29)
COMPLEXED PSA SERPL-MCNC: 1.3 NG/ML (ref 0–4)
CREAT SERPL-MCNC: 1.1 MG/DL (ref 0.5–1.4)
DIFFERENTIAL METHOD: NORMAL
EOSINOPHIL # BLD AUTO: 0.2 K/UL (ref 0–0.5)
EOSINOPHIL NFR BLD: 2.1 % (ref 0–8)
ERYTHROCYTE [DISTWIDTH] IN BLOOD BY AUTOMATED COUNT: 13.7 % (ref 11.5–14.5)
EST. GFR  (NO RACE VARIABLE): >60 ML/MIN/1.73 M^2
ESTIMATED AVG GLUCOSE: 111 MG/DL (ref 68–131)
GLUCOSE SERPL-MCNC: 97 MG/DL (ref 70–110)
HBA1C MFR BLD: 5.5 % (ref 4–5.6)
HCT VFR BLD AUTO: 44.4 % (ref 40–54)
HDLC SERPL-MCNC: 42 MG/DL (ref 40–75)
HDLC SERPL: 36.8 % (ref 20–50)
HGB BLD-MCNC: 14.9 G/DL (ref 14–18)
IMM GRANULOCYTES # BLD AUTO: 0.02 K/UL (ref 0–0.04)
IMM GRANULOCYTES NFR BLD AUTO: 0.3 % (ref 0–0.5)
LDLC SERPL CALC-MCNC: 58.8 MG/DL (ref 63–159)
LYMPHOCYTES # BLD AUTO: 2.3 K/UL (ref 1–4.8)
LYMPHOCYTES NFR BLD: 30.2 % (ref 18–48)
MCH RBC QN AUTO: 30.8 PG (ref 27–31)
MCHC RBC AUTO-ENTMCNC: 33.6 G/DL (ref 32–36)
MCV RBC AUTO: 92 FL (ref 82–98)
MONOCYTES # BLD AUTO: 0.8 K/UL (ref 0.3–1)
MONOCYTES NFR BLD: 10.4 % (ref 4–15)
NEUTROPHILS # BLD AUTO: 4.4 K/UL (ref 1.8–7.7)
NEUTROPHILS NFR BLD: 56.6 % (ref 38–73)
NONHDLC SERPL-MCNC: 72 MG/DL
NRBC BLD-RTO: 0 /100 WBC
PLATELET # BLD AUTO: 220 K/UL (ref 150–450)
PMV BLD AUTO: 10.5 FL (ref 9.2–12.9)
POTASSIUM SERPL-SCNC: 4.6 MMOL/L (ref 3.5–5.1)
PROT SERPL-MCNC: 7.3 G/DL (ref 6–8.4)
RBC # BLD AUTO: 4.83 M/UL (ref 4.6–6.2)
SODIUM SERPL-SCNC: 141 MMOL/L (ref 136–145)
T4 SERPL-MCNC: 5.9 UG/DL (ref 4.5–11.5)
TRIGL SERPL-MCNC: 66 MG/DL (ref 30–150)
TSH SERPL DL<=0.005 MIU/L-ACNC: 1.59 UIU/ML (ref 0.4–4)
WBC # BLD AUTO: 7.68 K/UL (ref 3.9–12.7)

## 2022-08-24 PROCEDURE — 80061 LIPID PANEL: CPT | Performed by: STUDENT IN AN ORGANIZED HEALTH CARE EDUCATION/TRAINING PROGRAM

## 2022-08-24 PROCEDURE — 1159F MED LIST DOCD IN RCRD: CPT | Mod: CPTII,S$GLB,, | Performed by: STUDENT IN AN ORGANIZED HEALTH CARE EDUCATION/TRAINING PROGRAM

## 2022-08-24 PROCEDURE — 82306 VITAMIN D 25 HYDROXY: CPT | Performed by: STUDENT IN AN ORGANIZED HEALTH CARE EDUCATION/TRAINING PROGRAM

## 2022-08-24 PROCEDURE — 3008F BODY MASS INDEX DOCD: CPT | Mod: CPTII,S$GLB,, | Performed by: STUDENT IN AN ORGANIZED HEALTH CARE EDUCATION/TRAINING PROGRAM

## 2022-08-24 PROCEDURE — 1159F PR MEDICATION LIST DOCUMENTED IN MEDICAL RECORD: ICD-10-PCS | Mod: CPTII,S$GLB,, | Performed by: STUDENT IN AN ORGANIZED HEALTH CARE EDUCATION/TRAINING PROGRAM

## 2022-08-24 PROCEDURE — 3008F PR BODY MASS INDEX (BMI) DOCUMENTED: ICD-10-PCS | Mod: CPTII,S$GLB,, | Performed by: STUDENT IN AN ORGANIZED HEALTH CARE EDUCATION/TRAINING PROGRAM

## 2022-08-24 PROCEDURE — 3075F SYST BP GE 130 - 139MM HG: CPT | Mod: CPTII,S$GLB,, | Performed by: STUDENT IN AN ORGANIZED HEALTH CARE EDUCATION/TRAINING PROGRAM

## 2022-08-24 PROCEDURE — 99999 PR PBB SHADOW E&M-EST. PATIENT-LVL V: ICD-10-PCS | Mod: PBBFAC,,, | Performed by: STUDENT IN AN ORGANIZED HEALTH CARE EDUCATION/TRAINING PROGRAM

## 2022-08-24 PROCEDURE — 86803 HEPATITIS C AB TEST: CPT | Performed by: STUDENT IN AN ORGANIZED HEALTH CARE EDUCATION/TRAINING PROGRAM

## 2022-08-24 PROCEDURE — 84443 ASSAY THYROID STIM HORMONE: CPT | Performed by: STUDENT IN AN ORGANIZED HEALTH CARE EDUCATION/TRAINING PROGRAM

## 2022-08-24 PROCEDURE — 85025 COMPLETE CBC W/AUTO DIFF WBC: CPT | Performed by: STUDENT IN AN ORGANIZED HEALTH CARE EDUCATION/TRAINING PROGRAM

## 2022-08-24 PROCEDURE — 99999 PR PBB SHADOW E&M-EST. PATIENT-LVL V: CPT | Mod: PBBFAC,,, | Performed by: STUDENT IN AN ORGANIZED HEALTH CARE EDUCATION/TRAINING PROGRAM

## 2022-08-24 PROCEDURE — 99386 PR PREVENTIVE VISIT,NEW,40-64: ICD-10-PCS | Mod: S$GLB,,, | Performed by: STUDENT IN AN ORGANIZED HEALTH CARE EDUCATION/TRAINING PROGRAM

## 2022-08-24 PROCEDURE — 3078F PR MOST RECENT DIASTOLIC BLOOD PRESSURE < 80 MM HG: ICD-10-PCS | Mod: CPTII,S$GLB,, | Performed by: STUDENT IN AN ORGANIZED HEALTH CARE EDUCATION/TRAINING PROGRAM

## 2022-08-24 PROCEDURE — 36415 COLL VENOUS BLD VENIPUNCTURE: CPT | Mod: PO | Performed by: STUDENT IN AN ORGANIZED HEALTH CARE EDUCATION/TRAINING PROGRAM

## 2022-08-24 PROCEDURE — 83036 HEMOGLOBIN GLYCOSYLATED A1C: CPT | Performed by: STUDENT IN AN ORGANIZED HEALTH CARE EDUCATION/TRAINING PROGRAM

## 2022-08-24 PROCEDURE — 87389 HIV-1 AG W/HIV-1&-2 AB AG IA: CPT | Performed by: STUDENT IN AN ORGANIZED HEALTH CARE EDUCATION/TRAINING PROGRAM

## 2022-08-24 PROCEDURE — 99386 PREV VISIT NEW AGE 40-64: CPT | Mod: S$GLB,,, | Performed by: STUDENT IN AN ORGANIZED HEALTH CARE EDUCATION/TRAINING PROGRAM

## 2022-08-24 PROCEDURE — 3078F DIAST BP <80 MM HG: CPT | Mod: CPTII,S$GLB,, | Performed by: STUDENT IN AN ORGANIZED HEALTH CARE EDUCATION/TRAINING PROGRAM

## 2022-08-24 PROCEDURE — 84153 ASSAY OF PSA TOTAL: CPT | Performed by: STUDENT IN AN ORGANIZED HEALTH CARE EDUCATION/TRAINING PROGRAM

## 2022-08-24 PROCEDURE — 84436 ASSAY OF TOTAL THYROXINE: CPT | Performed by: STUDENT IN AN ORGANIZED HEALTH CARE EDUCATION/TRAINING PROGRAM

## 2022-08-24 PROCEDURE — 3075F PR MOST RECENT SYSTOLIC BLOOD PRESS GE 130-139MM HG: ICD-10-PCS | Mod: CPTII,S$GLB,, | Performed by: STUDENT IN AN ORGANIZED HEALTH CARE EDUCATION/TRAINING PROGRAM

## 2022-08-24 PROCEDURE — 80053 COMPREHEN METABOLIC PANEL: CPT | Performed by: STUDENT IN AN ORGANIZED HEALTH CARE EDUCATION/TRAINING PROGRAM

## 2022-08-24 RX ORDER — HYDROCODONE BITARTRATE AND ACETAMINOPHEN 7.5; 325 MG/1; MG/1
1 TABLET ORAL EVERY 4 HOURS PRN
COMMUNITY
Start: 2022-08-19 | End: 2023-10-02

## 2022-08-24 RX ORDER — OXYCODONE AND ACETAMINOPHEN 5; 325 MG/1; MG/1
1 TABLET ORAL EVERY 8 HOURS PRN
COMMUNITY
Start: 2022-07-14 | End: 2023-10-02

## 2022-08-24 RX ORDER — CHLORHEXIDINE GLUCONATE ORAL RINSE 1.2 MG/ML
SOLUTION DENTAL
COMMUNITY
Start: 2022-08-19 | End: 2023-10-02

## 2022-08-24 RX ORDER — CARISOPRODOL 350 MG/1
350 TABLET ORAL 3 TIMES DAILY
COMMUNITY
Start: 2022-08-12 | End: 2023-10-02

## 2022-08-24 RX ORDER — PITAVASTATIN CALCIUM 2.09 MG/1
2 TABLET, FILM COATED ORAL DAILY
COMMUNITY
Start: 2022-07-07 | End: 2023-10-02

## 2022-08-24 RX ORDER — PANTOPRAZOLE SODIUM 40 MG/1
40 TABLET, DELAYED RELEASE ORAL EVERY MORNING
COMMUNITY
Start: 2022-05-31

## 2022-08-24 RX ORDER — METHOCARBAMOL 500 MG/1
1000 TABLET, FILM COATED ORAL 4 TIMES DAILY PRN
COMMUNITY
Start: 2022-07-06

## 2022-08-25 LAB
HCV AB SERPL QL IA: NEGATIVE
HIV 1+2 AB+HIV1 P24 AG SERPL QL IA: NEGATIVE

## 2022-09-25 ENCOUNTER — PATIENT MESSAGE (OUTPATIENT)
Dept: INTERNAL MEDICINE | Facility: CLINIC | Age: 62
End: 2022-09-25
Payer: COMMERCIAL

## 2022-09-25 DIAGNOSIS — G47.33 OBSTRUCTIVE SLEEP APNEA: Primary | ICD-10-CM

## 2023-03-21 ENCOUNTER — TELEPHONE (OUTPATIENT)
Dept: RHEUMATOLOGY | Facility: CLINIC | Age: 63
End: 2023-03-21
Payer: COMMERCIAL

## 2023-03-21 ENCOUNTER — PATIENT MESSAGE (OUTPATIENT)
Dept: RHEUMATOLOGY | Facility: CLINIC | Age: 63
End: 2023-03-21
Payer: COMMERCIAL

## 2023-03-21 NOTE — TELEPHONE ENCOUNTER
----- Message from Roxy Pinto sent at 3/20/2023  4:13 PM CDT -----  Type:  Sooner Apoointment Request    Caller is requesting a sooner appointment.  Caller declined first available appointment listed below.  Caller will not accept being placed on the waitlist and is requesting a message be sent to doctor.  Name of Caller:pt wife Rossy  When is the first available appointment?none  Symptoms:abnormal blood work  Would the patient rather a call back or a response via [facility]? Call back  Best Call Back Number:[phone]   wife #: [phone]    Additional Information: Orthopedics referred pt to Rheumatology due to blood work - said it could be rheumatoid arthritis or a different auto immune disease. Please advise - thank you    Left message through  portal . LUIS

## 2023-05-24 ENCOUNTER — OFFICE VISIT (OUTPATIENT)
Dept: INTERNAL MEDICINE | Facility: CLINIC | Age: 63
End: 2023-05-24
Payer: COMMERCIAL

## 2023-05-24 VITALS
OXYGEN SATURATION: 98 % | TEMPERATURE: 98 F | SYSTOLIC BLOOD PRESSURE: 128 MMHG | WEIGHT: 223.56 LBS | BODY MASS INDEX: 32.01 KG/M2 | DIASTOLIC BLOOD PRESSURE: 72 MMHG | HEIGHT: 70 IN | HEART RATE: 72 BPM | RESPIRATION RATE: 16 BRPM

## 2023-05-24 DIAGNOSIS — G89.4 CHRONIC PAIN SYNDROME: Primary | ICD-10-CM

## 2023-05-24 DIAGNOSIS — F43.9 STRESS: ICD-10-CM

## 2023-05-24 PROCEDURE — 1159F MED LIST DOCD IN RCRD: CPT | Mod: CPTII,S$GLB,, | Performed by: STUDENT IN AN ORGANIZED HEALTH CARE EDUCATION/TRAINING PROGRAM

## 2023-05-24 PROCEDURE — 3074F PR MOST RECENT SYSTOLIC BLOOD PRESSURE < 130 MM HG: ICD-10-PCS | Mod: CPTII,S$GLB,, | Performed by: STUDENT IN AN ORGANIZED HEALTH CARE EDUCATION/TRAINING PROGRAM

## 2023-05-24 PROCEDURE — 3008F PR BODY MASS INDEX (BMI) DOCUMENTED: ICD-10-PCS | Mod: CPTII,S$GLB,, | Performed by: STUDENT IN AN ORGANIZED HEALTH CARE EDUCATION/TRAINING PROGRAM

## 2023-05-24 PROCEDURE — 3008F BODY MASS INDEX DOCD: CPT | Mod: CPTII,S$GLB,, | Performed by: STUDENT IN AN ORGANIZED HEALTH CARE EDUCATION/TRAINING PROGRAM

## 2023-05-24 PROCEDURE — 3074F SYST BP LT 130 MM HG: CPT | Mod: CPTII,S$GLB,, | Performed by: STUDENT IN AN ORGANIZED HEALTH CARE EDUCATION/TRAINING PROGRAM

## 2023-05-24 PROCEDURE — 99215 OFFICE O/P EST HI 40 MIN: CPT | Mod: S$GLB,,, | Performed by: STUDENT IN AN ORGANIZED HEALTH CARE EDUCATION/TRAINING PROGRAM

## 2023-05-24 PROCEDURE — 1159F PR MEDICATION LIST DOCUMENTED IN MEDICAL RECORD: ICD-10-PCS | Mod: CPTII,S$GLB,, | Performed by: STUDENT IN AN ORGANIZED HEALTH CARE EDUCATION/TRAINING PROGRAM

## 2023-05-24 PROCEDURE — 3078F DIAST BP <80 MM HG: CPT | Mod: CPTII,S$GLB,, | Performed by: STUDENT IN AN ORGANIZED HEALTH CARE EDUCATION/TRAINING PROGRAM

## 2023-05-24 PROCEDURE — 99999 PR PBB SHADOW E&M-EST. PATIENT-LVL V: CPT | Mod: PBBFAC,,, | Performed by: STUDENT IN AN ORGANIZED HEALTH CARE EDUCATION/TRAINING PROGRAM

## 2023-05-24 PROCEDURE — 3078F PR MOST RECENT DIASTOLIC BLOOD PRESSURE < 80 MM HG: ICD-10-PCS | Mod: CPTII,S$GLB,, | Performed by: STUDENT IN AN ORGANIZED HEALTH CARE EDUCATION/TRAINING PROGRAM

## 2023-05-24 PROCEDURE — 99215 PR OFFICE/OUTPT VISIT, EST, LEVL V, 40-54 MIN: ICD-10-PCS | Mod: S$GLB,,, | Performed by: STUDENT IN AN ORGANIZED HEALTH CARE EDUCATION/TRAINING PROGRAM

## 2023-05-24 PROCEDURE — 99999 PR PBB SHADOW E&M-EST. PATIENT-LVL V: ICD-10-PCS | Mod: PBBFAC,,, | Performed by: STUDENT IN AN ORGANIZED HEALTH CARE EDUCATION/TRAINING PROGRAM

## 2023-05-24 RX ORDER — AMITRIPTYLINE HYDROCHLORIDE 25 MG/1
25 TABLET, FILM COATED ORAL NIGHTLY PRN
Qty: 90 TABLET | Refills: 1 | OUTPATIENT
Start: 2023-05-24 | End: 2023-10-01

## 2023-05-24 RX ORDER — DULOXETIN HYDROCHLORIDE 30 MG/1
30 CAPSULE, DELAYED RELEASE ORAL
COMMUNITY
Start: 2023-04-28 | End: 2023-10-02

## 2023-05-24 RX ORDER — ROSUVASTATIN CALCIUM 20 MG/1
20 TABLET, COATED ORAL DAILY
Qty: 90 TABLET | Refills: 3 | Status: SHIPPED | OUTPATIENT
Start: 2023-05-24 | End: 2023-10-02

## 2023-05-24 NOTE — PROGRESS NOTES
Subjective:      Chief Complaint: Follow-up ([facility]. 6 month follow up)    HPI  Mr. Gonzalez Lockhart is a 63 yo M with L-sided CNVI palsy, chronic allergic rhinitis, Afib (formerly on Metoprolol, flecainide, and Eliquis; now s/p ablation), VitD def, GERD, obstructive sleep apnea, sacroiliitis/chronic back pain (following with both outside Ortho and Pain), and Hx of food allergies presenting for routine follow-up:    Pain:  - diffused muscular and skeletal pain ( presented with outside imaging a testing to diffuse spinal inflammatory changes with minimal associated disc extrusions/herniations ).    - Continues to follow with peaks and pain management;  insufficient improvement with Celebrex, prior trial of gabapentin, and has relative contraindications to NSAIDs.  - No prior evaluation /empiric treatment for potential underlying fibromyalgia;  reports significant psychosocial stressors     ASCVD risk:  -  high enough to warrant empiric high-intensity therapy    Review of Systems   Constitutional:  Negative for appetite change, chills and fever.   HENT: Negative.     Respiratory:  Negative for cough, chest tightness and shortness of breath.    Cardiovascular:  Negative for chest pain, palpitations and leg swelling.   Gastrointestinal:  Negative for abdominal distention, abdominal pain, blood in stool, constipation, diarrhea, nausea and vomiting.   Endocrine: Negative.    Genitourinary:  Negative for difficulty urinating, dysuria, frequency and hematuria.   Musculoskeletal: Negative.    Integumentary:  Negative.   Neurological: Negative.    Psychiatric/Behavioral: Negative.         Objective:      Vitals:    05/24/23 1514   BP: 128/72   Pulse: 72   Resp: 16   Temp: 97.8 °F (36.6 °C)      Physical Exam  Vitals reviewed.   Constitutional:       General: He is not in acute distress.     Appearance: Normal appearance.   HENT:      Head: Normocephalic and atraumatic.      Comments: Facial features are symmetric       Nose: Nose normal. No congestion or rhinorrhea.      Mouth/Throat:      Mouth: Mucous membranes are moist.      Pharynx: Oropharynx is clear. No oropharyngeal exudate or posterior oropharyngeal erythema.   Eyes:      General: No scleral icterus.     Extraocular Movements: Extraocular movements intact.      Conjunctiva/sclera: Conjunctivae normal.   Cardiovascular:      Rate and Rhythm: Normal rate and regular rhythm.      Pulses: Normal pulses.      Heart sounds: Normal heart sounds.   Pulmonary:      Effort: Pulmonary effort is normal. No respiratory distress.      Breath sounds: Normal breath sounds.   Musculoskeletal:         General: No deformity or signs of injury. Normal range of motion.      Cervical back: Normal range of motion.      Comments: Gait normal    Skin:     General: Skin is warm and dry.      Findings: No rash.   Neurological:      General: No focal deficit present.      Mental Status: He is alert and oriented to person, place, and time. Mental status is at baseline.   Psychiatric:         Mood and Affect: Mood normal.         Behavior: Behavior normal.         Thought Content: Thought content normal.     Current Outpatient Medications on File Prior to Visit   Medication Sig Dispense Refill    fexofenadine HCl (ALLEGRA ORAL) Take by mouth.      HYDROcodone-acetaminophen (NORCO) 7.5-325 mg per tablet Take 1 tablet by mouth every 4 (four) hours as needed.      LIVALO 2 mg Tab tablet Take 2 mg by mouth once daily.      LORazepam (ATIVAN) 1 MG tablet Take 1 mg by mouth nightly as needed.  0    methocarbamoL (ROBAXIN) 500 MG Tab Take 1,000 mg by mouth 4 (four) times daily as needed.      METOPROLOL SUCCINATE ORAL Take 25 mg by mouth daily as needed.  2    pantoprazole (PROTONIX) 40 MG tablet Take 40 mg by mouth every morning.      carisoprodoL (SOMA) 350 MG tablet Take 350 mg by mouth 3 (three) times daily.      cetirizine (ZYRTEC) 10 MG tablet Take 10 mg by mouth as needed.        chlorhexidine  (PERIDEX) 0.12 % solution SMARTSI Teaspoon By Mouth 3 Times Daily      cholecalciferol, vitamin D3, 50,000 unit capsule TK 1 C PO WEEKLY  0    CONTRAVE 8-90 mg TbSR TK 2 TS PO BID  3    DULoxetine (CYMBALTA) 30 MG capsule Take 30 mg by mouth.      ELIQUIS 5 mg Tab TK 1 T PO  BID  3    ergocalciferol, vitamin D2, (VITAMIN D ORAL) Take by mouth.      flecainide (TAMBOCOR) 100 MG Tab TK 1 T PO BID  3    metoprolol anderson-hydrochlorothiaz 25-12.5 mg Tb24 metoprolol succ 25 mg-hydrochlorothiazide 12.5 mg tablet,ext.rel 24 hr   Take 1 tablet every day by oral route.      metoprolol succinate (TOPROL-XL) 25 MG 24 hr tablet TK 1 T PO QD  0    oxyCODONE-acetaminophen (PERCOCET) 5-325 mg per tablet Take 1 tablet by mouth every 8 (eight) hours as needed.      ranitidine (ZANTAC) 150 MG tablet TAKE 1 TABLET(150 MG) BY MOUTH TWICE DAILY (Patient not taking: Reported on 2022) 60 tablet 0     No current facility-administered medications on file prior to visit.         Assessment:       1. Chronic pain syndrome    2. Stress        Plan:       Chronic pain syndrome  Stress  -     Ambulatory referral/consult to Psychology; Future; Expected date: 2023   -  extensive review of past orthopedic history, prior trials (direct interventions, physical therapy, and pharmacotherapy), and current symptoms had today   -  will trial q.h.s. amitriptyline for combination antispasmodic,  anti neuropathic, anti insomnia and fibromyalgia suppressant affects     Well in excess of 40 minutes was spent on this appointment today; all of which directly face-to-face with the patient    Other orders  -     rosuvastatin (CRESTOR) 20 MG tablet; Take 1 tablet (20 mg total) by mouth once daily.  Dispense: 90 tablet; Refill: 3  -     amitriptyline (ELAVIL) 25 MG tablet; Take 1 tablet (25 mg total) by mouth nightly as needed for Insomnia or Pain.  Dispense: 90 tablet; Refill: 1

## 2023-05-30 ENCOUNTER — HOSPITAL ENCOUNTER (EMERGENCY)
Facility: HOSPITAL | Age: 63
Discharge: HOME | End: 2023-05-30
Attending: STUDENT IN AN ORGANIZED HEALTH CARE EDUCATION/TRAINING PROGRAM
Payer: COMMERCIAL

## 2023-05-30 VITALS
HEIGHT: 70 IN | RESPIRATION RATE: 13 BRPM | TEMPERATURE: 98 F | WEIGHT: 222 LBS | OXYGEN SATURATION: 96 % | BODY MASS INDEX: 31.78 KG/M2 | DIASTOLIC BLOOD PRESSURE: 68 MMHG | SYSTOLIC BLOOD PRESSURE: 130 MMHG | HEART RATE: 84 BPM

## 2023-05-30 DIAGNOSIS — R10.9 ABDOMINAL PAIN: ICD-10-CM

## 2023-05-30 DIAGNOSIS — R11.2 NAUSEA AND VOMITING, UNSPECIFIED VOMITING TYPE: Primary | ICD-10-CM

## 2023-05-30 DIAGNOSIS — R19.7 DIARRHEA, UNSPECIFIED TYPE: ICD-10-CM

## 2023-05-30 LAB
ALBUMIN SERPL BCP-MCNC: 4.5 G/DL (ref 3.5–5.2)
ALP SERPL-CCNC: 58 U/L (ref 55–135)
ALT SERPL W/O P-5'-P-CCNC: 27 U/L (ref 10–44)
ANION GAP SERPL CALC-SCNC: 13 MMOL/L (ref 8–16)
AST SERPL-CCNC: 19 U/L (ref 10–40)
BACTERIA #/AREA URNS HPF: NORMAL /HPF
BASOPHILS # BLD AUTO: 0.02 K/UL (ref 0–0.2)
BASOPHILS NFR BLD: 0.2 % (ref 0–1.9)
BILIRUB SERPL-MCNC: 1.4 MG/DL (ref 0.1–1)
BILIRUB UR QL STRIP: NEGATIVE
BUN SERPL-MCNC: 20 MG/DL (ref 8–23)
CALCIUM SERPL-MCNC: 9.8 MG/DL (ref 8.7–10.5)
CHLORIDE SERPL-SCNC: 103 MMOL/L (ref 95–110)
CLARITY UR: CLEAR
CO2 SERPL-SCNC: 22 MMOL/L (ref 23–29)
COLOR UR: YELLOW
CREAT SERPL-MCNC: 1.1 MG/DL (ref 0.5–1.4)
DIFFERENTIAL METHOD: ABNORMAL
EOSINOPHIL # BLD AUTO: 0 K/UL (ref 0–0.5)
EOSINOPHIL NFR BLD: 0 % (ref 0–8)
ERYTHROCYTE [DISTWIDTH] IN BLOOD BY AUTOMATED COUNT: 13.2 % (ref 11.5–14.5)
EST. GFR  (NO RACE VARIABLE): >60 ML/MIN/1.73 M^2
GLUCOSE SERPL-MCNC: 119 MG/DL (ref 70–110)
GLUCOSE UR QL STRIP: NEGATIVE
HCT VFR BLD AUTO: 47.5 % (ref 40–54)
HCV AB SERPL QL IA: NORMAL
HGB BLD-MCNC: 16.6 G/DL (ref 14–18)
HGB UR QL STRIP: NEGATIVE
HIV 1+2 AB+HIV1 P24 AG SERPL QL IA: NORMAL
HYALINE CASTS #/AREA URNS LPF: 0 /LPF
IMM GRANULOCYTES # BLD AUTO: 0.03 K/UL (ref 0–0.04)
IMM GRANULOCYTES NFR BLD AUTO: 0.2 % (ref 0–0.5)
KETONES UR QL STRIP: ABNORMAL
LEUKOCYTE ESTERASE UR QL STRIP: NEGATIVE
LIPASE SERPL-CCNC: 6 U/L (ref 4–60)
LYMPHOCYTES # BLD AUTO: 0.3 K/UL (ref 1–4.8)
LYMPHOCYTES NFR BLD: 2.3 % (ref 18–48)
MCH RBC QN AUTO: 30.3 PG (ref 27–31)
MCHC RBC AUTO-ENTMCNC: 34.9 G/DL (ref 32–36)
MCV RBC AUTO: 87 FL (ref 82–98)
MICROSCOPIC COMMENT: NORMAL
MONOCYTES # BLD AUTO: 0.7 K/UL (ref 0.3–1)
MONOCYTES NFR BLD: 5.1 % (ref 4–15)
NEUTROPHILS # BLD AUTO: 12.2 K/UL (ref 1.8–7.7)
NEUTROPHILS NFR BLD: 92.2 % (ref 38–73)
NITRITE UR QL STRIP: NEGATIVE
NRBC BLD-RTO: 0 /100 WBC
PH UR STRIP: >8 [PH] (ref 5–8)
PLATELET # BLD AUTO: 225 K/UL (ref 150–450)
PMV BLD AUTO: 9.7 FL (ref 9.2–12.9)
POTASSIUM SERPL-SCNC: 4.2 MMOL/L (ref 3.5–5.1)
PROT SERPL-MCNC: 7.6 G/DL (ref 6–8.4)
PROT UR QL STRIP: ABNORMAL
RBC # BLD AUTO: 5.47 M/UL (ref 4.6–6.2)
RBC #/AREA URNS HPF: 3 /HPF (ref 0–4)
SODIUM SERPL-SCNC: 138 MMOL/L (ref 136–145)
SP GR UR STRIP: 1.01 (ref 1–1.03)
URN SPEC COLLECT METH UR: ABNORMAL
UROBILINOGEN UR STRIP-ACNC: NEGATIVE EU/DL
WBC # BLD AUTO: 13.17 K/UL (ref 3.9–12.7)
WBC #/AREA URNS HPF: 1 /HPF (ref 0–5)

## 2023-05-30 PROCEDURE — 86803 HEPATITIS C AB TEST: CPT | Performed by: EMERGENCY MEDICINE

## 2023-05-30 PROCEDURE — 85025 COMPLETE CBC W/AUTO DIFF WBC: CPT | Performed by: STUDENT IN AN ORGANIZED HEALTH CARE EDUCATION/TRAINING PROGRAM

## 2023-05-30 PROCEDURE — 81000 URINALYSIS NONAUTO W/SCOPE: CPT | Performed by: STUDENT IN AN ORGANIZED HEALTH CARE EDUCATION/TRAINING PROGRAM

## 2023-05-30 PROCEDURE — 96374 THER/PROPH/DIAG INJ IV PUSH: CPT

## 2023-05-30 PROCEDURE — 96372 THER/PROPH/DIAG INJ SC/IM: CPT | Mod: 59 | Performed by: STUDENT IN AN ORGANIZED HEALTH CARE EDUCATION/TRAINING PROGRAM

## 2023-05-30 PROCEDURE — 93010 ELECTROCARDIOGRAM REPORT: CPT | Mod: ,,, | Performed by: INTERNAL MEDICINE

## 2023-05-30 PROCEDURE — 96361 HYDRATE IV INFUSION ADD-ON: CPT

## 2023-05-30 PROCEDURE — 74176 CT ABD & PELVIS W/O CONTRAST: CPT | Mod: 26,,, | Performed by: RADIOLOGY

## 2023-05-30 PROCEDURE — 63600175 PHARM REV CODE 636 W HCPCS: Performed by: STUDENT IN AN ORGANIZED HEALTH CARE EDUCATION/TRAINING PROGRAM

## 2023-05-30 PROCEDURE — 74176 CT ABDOMEN PELVIS WITHOUT CONTRAST: ICD-10-PCS | Mod: 26,,, | Performed by: RADIOLOGY

## 2023-05-30 PROCEDURE — 93010 EKG 12-LEAD: ICD-10-PCS | Mod: ,,, | Performed by: INTERNAL MEDICINE

## 2023-05-30 PROCEDURE — 25000003 PHARM REV CODE 250: Performed by: STUDENT IN AN ORGANIZED HEALTH CARE EDUCATION/TRAINING PROGRAM

## 2023-05-30 PROCEDURE — 74176 CT ABD & PELVIS W/O CONTRAST: CPT | Mod: TC

## 2023-05-30 PROCEDURE — 99285 EMERGENCY DEPT VISIT HI MDM: CPT | Mod: 25

## 2023-05-30 PROCEDURE — 83690 ASSAY OF LIPASE: CPT | Performed by: STUDENT IN AN ORGANIZED HEALTH CARE EDUCATION/TRAINING PROGRAM

## 2023-05-30 PROCEDURE — 93005 ELECTROCARDIOGRAM TRACING: CPT

## 2023-05-30 PROCEDURE — 80053 COMPREHEN METABOLIC PANEL: CPT | Performed by: STUDENT IN AN ORGANIZED HEALTH CARE EDUCATION/TRAINING PROGRAM

## 2023-05-30 PROCEDURE — 87389 HIV-1 AG W/HIV-1&-2 AB AG IA: CPT | Performed by: EMERGENCY MEDICINE

## 2023-05-30 RX ORDER — DICYCLOMINE HYDROCHLORIDE 10 MG/ML
20 INJECTION INTRAMUSCULAR
Status: COMPLETED | OUTPATIENT
Start: 2023-05-30 | End: 2023-05-30

## 2023-05-30 RX ORDER — ONDANSETRON 2 MG/ML
4 INJECTION INTRAMUSCULAR; INTRAVENOUS
Status: COMPLETED | OUTPATIENT
Start: 2023-05-30 | End: 2023-05-30

## 2023-05-30 RX ORDER — ONDANSETRON 4 MG/1
4 TABLET, FILM COATED ORAL EVERY 6 HOURS
Qty: 12 TABLET | Refills: 0 | Status: SHIPPED | OUTPATIENT
Start: 2023-05-30 | End: 2023-10-02

## 2023-05-30 RX ADMIN — ONDANSETRON 4 MG: 2 INJECTION INTRAMUSCULAR; INTRAVENOUS at 12:05

## 2023-05-30 RX ADMIN — DICYCLOMINE HYDROCHLORIDE 20 MG: 20 INJECTION INTRAMUSCULAR at 02:05

## 2023-05-30 RX ADMIN — SODIUM CHLORIDE 1000 ML: 9 INJECTION, SOLUTION INTRAVENOUS at 12:05

## 2023-05-30 NOTE — ED PROVIDER NOTES
HPI: Patient is a 62 y.o. male who presents with the chief complaint of vomiting, abdominal cramping and diarrhea.  The patient states that he ate at a [employer] buffet 2 days ago for lunch and then about 24 hours later started to have abdominal cramping, multiple episodes of diarrhea, the cramping worsened throughout the night last night and then this morning had 3 episodes of vomiting, no blood or bile in the vomit, just stomach contents.  He is had some chills but no fevers, endorses some epigastric type abdominal pain.  His wife and son ate at the same buffet but did not eat the same food as him, no one else has any symptoms.  States he feels like 1 of the pieces of chicken tasted weird but he ate.  No blood in the stool    REVIEW OF SYSTEMS - 10 systems were independently reviewed and are otherwise negative with the exception of those items previously documented in the HPI and nursing notes.    Allergy: Nitroglycerin; Iodinated contrast media; Adhesive; Fish containing products; Iodine; Iodine and iodide containing products; Latex, natural rubber; Amoxicillin; Onion; and Tree pollen-black walnut    Past medical history:   Past Medical History:   Diagnosis Date    A-fib     Vitamin D deficiency        Surgical History:   Past Surgical History:   Procedure Laterality Date    COLONOSCOPY N/A 7/5/2018    Procedure: COLONOSCOPY; rubber band ligation of hemorrhoids;  Surgeon: Kennedy Marr MD;  Location: [address];  Service: Endoscopy;  Laterality: N/A;       Social history:   Social History     Socioeconomic History    Marital status:    Tobacco Use    Smoking status: Never    Smokeless tobacco: Never   Substance and Sexual Activity    Alcohol use: Yes     Comment: Rarely    Drug use: No    Sexual activity: Yes     Partners: Female       Family history: {non-contributory    EHR: reviewed    History obtained from: {the patient        Vitals: /68   Pulse 84   Temp 98.1 °F (36.7 °C)    "Resp 13   Ht 5' 10" (1.778 m)   Wt 100.7 kg (222 lb)   SpO2 96%   BMI 31.85 kg/m²     PHYSICAL EXAM:    GENERAL: awake and alert, oriented, GCS 15,   HEENT:  normocephalic, atraumatic, sclerae anicteric, tachy mucus membranes, Normal facial symmetry,   CARDIOVASCULAR: regular rate and rhythm, no murmurs, rubs, gallops,   PULMONARY: unlabored, no respiratory distress, CTAB,   GASTROINTESTINAL:  Mild epigastric tenderness non-distended, no rebound, no guarding,   NEUROLOGIC: Lucid with normal mental status, speech fluid,   MUSCULOSKELETAL: well-nourished, well-developed, no joint deformities, no lower extremity swelling   SKIN: warm and dry, no visible rashes,   PSYCHIATRIC: normal affect, normal concentration,              Labs Reviewed   CBC W/ AUTO DIFFERENTIAL - Abnormal; Notable for the following components:       Result Value    WBC 13.17 (*)     Gran # (ANC) 12.2 (*)     Lymph # 0.3 (*)     Gran % 92.2 (*)     Lymph % 2.3 (*)     All other components within normal limits    Narrative:     For upper or mid abdominal pain.   COMPREHENSIVE METABOLIC PANEL - Abnormal; Notable for the following components:    CO2 22 (*)     Glucose 119 (*)     Total Bilirubin 1.4 (*)     All other components within normal limits    Narrative:     For upper or mid abdominal pain.   URINALYSIS, REFLEX TO URINE CULTURE - Abnormal; Notable for the following components:    pH, UA >8.0 (*)     Protein, UA 1+ (*)     Ketones, UA 1+ (*)     All other components within normal limits    Narrative:     In and Out Cath as needed it patient unable to void  Preferred Collection Type->Urine, Clean Catch  Specimen Source->Urine   HIV 1 / 2 ANTIBODY    Narrative:     Release to patient->Immediate   HEPATITIS C ANTIBODY    Narrative:     Release to patient->Immediate   LIPASE    Narrative:     For upper or mid abdominal pain.   URINALYSIS MICROSCOPIC    Narrative:     In and Out Cath as needed it patient unable to void  Preferred Collection " Type->Urine, Clean Catch  Specimen Source->Urine       CT Abdomen Pelvis  Without Contrast   Final Result      1. Hepatic steatosis   2. Small nonobstructing left renal calculus   3. Diverticulosis   4. Fluid distended but nondilated loops of small bowel.  This can be seen with gastroenteritis.         Electronically signed by: Vasile Zapien   Date:    05/30/2023   Time:    15:37            MEDICAL DECISION MAKING: Patient is a 62 y.o. male who presented with chief complaint of vomiting and diarrhea.  Patient states this started yesterday and has been expressing abdominal cramping vomiting and diarrhea since then.  He appears mildly uncomfortable abdominal exam is overall benign no rebound or guarding.  His vital signs been stable and within normal limits throughout his stay emergency department.  He was given IM Bentyl, IV fluid as well as Zofran.  There are no clinically significant lab abnormalities and the patient does have a mild leukocytosis and elevated bilirubin likely secondary to stress demargination/acute phase reactants.  No evidence of urinary tract infection, lipase is normal at low suspicion for pancreatitis.  CT abdomen pelvis shows hepatic steatosis and a small left renal calculus but no evidence of diverticulitis, bowel obstruction the patient does have distended loops of small bowel with no obstruction likely representative of gastroenteritis.  Patient will be some Zofran, he was tolerating p.o. prior to discharge and is stable for outpatient management.  Given strict return precautions.    CLINICAL IMPRESSION:  1. Nausea and vomiting, unspecified vomiting type    2. Abdominal pain    3. Diarrhea, unspecified type        Please note that my documentation in this Electronic Healthcare Record was produced using speech recognition software and therefore may contain errors related to that software.These could include grammar, punctuation and spelling errors or the inclusion/ exclusion of phrases that  were not intended. Please contact myself for any clarification, questions or concerns.     Cassidy Gonzales MD  05/30/23 1940

## 2023-06-22 ENCOUNTER — OFFICE VISIT (OUTPATIENT)
Dept: RHEUMATOLOGY | Facility: CLINIC | Age: 63
End: 2023-06-22
Payer: COMMERCIAL

## 2023-06-22 VITALS
SYSTOLIC BLOOD PRESSURE: 121 MMHG | OXYGEN SATURATION: 95 % | WEIGHT: 218.5 LBS | DIASTOLIC BLOOD PRESSURE: 70 MMHG | HEART RATE: 68 BPM | HEIGHT: 70 IN | BODY MASS INDEX: 31.28 KG/M2

## 2023-06-22 DIAGNOSIS — R76.8 POSITIVE ANA (ANTINUCLEAR ANTIBODY): Primary | ICD-10-CM

## 2023-06-22 DIAGNOSIS — Z71.89 COUNSELING AND COORDINATION OF CARE: ICD-10-CM

## 2023-06-22 DIAGNOSIS — E66.9 CLASS 1 OBESITY WITH BODY MASS INDEX (BMI) OF 31.0 TO 31.9 IN ADULT, UNSPECIFIED OBESITY TYPE, UNSPECIFIED WHETHER SERIOUS COMORBIDITY PRESENT: ICD-10-CM

## 2023-06-22 DIAGNOSIS — M15.9 PRIMARY OSTEOARTHRITIS INVOLVING MULTIPLE JOINTS: ICD-10-CM

## 2023-06-22 PROCEDURE — 1159F PR MEDICATION LIST DOCUMENTED IN MEDICAL RECORD: ICD-10-PCS | Mod: CPTII,S$GLB,, | Performed by: INTERNAL MEDICINE

## 2023-06-22 PROCEDURE — 99999 PR PBB SHADOW E&M-EST. PATIENT-LVL V: CPT | Mod: PBBFAC,,, | Performed by: INTERNAL MEDICINE

## 2023-06-22 PROCEDURE — 3008F PR BODY MASS INDEX (BMI) DOCUMENTED: ICD-10-PCS | Mod: CPTII,S$GLB,, | Performed by: INTERNAL MEDICINE

## 2023-06-22 PROCEDURE — 3078F DIAST BP <80 MM HG: CPT | Mod: CPTII,S$GLB,, | Performed by: INTERNAL MEDICINE

## 2023-06-22 PROCEDURE — 3074F SYST BP LT 130 MM HG: CPT | Mod: CPTII,S$GLB,, | Performed by: INTERNAL MEDICINE

## 2023-06-22 PROCEDURE — 99205 OFFICE O/P NEW HI 60 MIN: CPT | Mod: S$GLB,,, | Performed by: INTERNAL MEDICINE

## 2023-06-22 PROCEDURE — 1159F MED LIST DOCD IN RCRD: CPT | Mod: CPTII,S$GLB,, | Performed by: INTERNAL MEDICINE

## 2023-06-22 PROCEDURE — 3074F PR MOST RECENT SYSTOLIC BLOOD PRESSURE < 130 MM HG: ICD-10-PCS | Mod: CPTII,S$GLB,, | Performed by: INTERNAL MEDICINE

## 2023-06-22 PROCEDURE — 3008F BODY MASS INDEX DOCD: CPT | Mod: CPTII,S$GLB,, | Performed by: INTERNAL MEDICINE

## 2023-06-22 PROCEDURE — 99205 PR OFFICE/OUTPT VISIT, NEW, LEVL V, 60-74 MIN: ICD-10-PCS | Mod: S$GLB,,, | Performed by: INTERNAL MEDICINE

## 2023-06-22 PROCEDURE — 3078F PR MOST RECENT DIASTOLIC BLOOD PRESSURE < 80 MM HG: ICD-10-PCS | Mod: CPTII,S$GLB,, | Performed by: INTERNAL MEDICINE

## 2023-06-22 PROCEDURE — 99999 PR PBB SHADOW E&M-EST. PATIENT-LVL V: ICD-10-PCS | Mod: PBBFAC,,, | Performed by: INTERNAL MEDICINE

## 2023-06-22 NOTE — PROGRESS NOTES
RHEUMATOLOGY OUTPATIENT CLINIC NOTE    6/22/2023    Attending Rheumatologist: Danyel Mckeon  Primary Care Provider: Jina Escamilla MD   Physician Requesting Consultation: Aaareferral Self  No address on file  Chief Complaint/Reason For Consultation:  abnormal blood test and Pain      Subjective:       HPI  Gonzalez Lockhart is a 62 y.o. White male with medical history noted below who presents for evaluation of joint pain and +ERICH.     Patient presents for evaluation of +ERICH and joint pain. He notes joint pain dating back about 10 years, though feels as it is progressing. He notes saw Ortho, had MRI on hip and L-spine, and that's when the work up was done. Patient notes the left hip pain is something new (?started after cardiac ablation). Also notes his hands are tight, he cannot make a fist in the morning, +paraesthesias. Feels his knees and ankles popping. Chronic back pain, notes lifting worsens the back. He notes that if he just lays there the pain is worse, he has noticed that movement helps. Tylenol provides no relief, uses Norco with improvement. Does not take NSAIDs due to GI issues. +Fatigue, no wt loss, night sweats.+Dry mouth (uses CPAP), Hives (allergies), Hx of angioedema, GERD (oily/spicy foods), intentional wt loss. No HA, brain fog/forgetfulness, Alopecia, Oral/Nasal Ulcers, Rash, Photosensitivity, Dry Eyes, Pleuritis/serositis, Easy Bruising, LAD, Raynaud's, Blood clots, Skin tightening, SOB, chest pain, fevers, constipation/diarrhea,  issues. Parents with arthritis, unclear if RA.    Review of Systems   Constitutional:  Positive for fatigue. Negative for chills, fever and unexpected weight change.   HENT:  Negative for mouth sores.    Eyes:  Negative for redness and eye dryness.   Respiratory:  Negative for cough and shortness of breath.    Cardiovascular:  Negative for chest pain.   Gastrointestinal:  Negative for abdominal distention, constipation, diarrhea, nausea, vomiting and  reflux.   Musculoskeletal:  Positive for arthralgias and back pain. Negative for gait problem, joint swelling, leg pain, myalgias, neck pain, neck stiffness and joint deformity.   Integumentary:  Negative for rash.   Neurological:  Negative for weakness, numbness, headaches and memory loss.   Hematological:  Negative for adenopathy. Does not bruise/bleed easily.   Psychiatric/Behavioral:  Negative for confusion, decreased concentration, sleep disturbance and suicidal ideas. The patient is not nervous/anxious.    All other systems reviewed and are negative.     Chronic comorbid conditions affecting medical decision making today:  Past Medical History:   Diagnosis Date    A-fib     Vitamin D deficiency      Past Surgical History:   Procedure Laterality Date    COLONOSCOPY N/A 7/5/2018    Procedure: COLONOSCOPY; rubber band ligation of hemorrhoids;  Surgeon: Kennedy Marr MD;  Location: [address];  Service: Endoscopy;  Laterality: N/A;     History reviewed. No pertinent family history.  Social History     Substance and Sexual Activity   Alcohol Use Yes    Comment: Rarely     Social History     Tobacco Use   Smoking Status Never   Smokeless Tobacco Never     Social History     Substance and Sexual Activity   Drug Use No       Current Outpatient Medications:     fexofenadine HCl (ALLEGRA ORAL), Take by mouth., Disp: , Rfl:     HYDROcodone-acetaminophen (NORCO) 7.5-325 mg per tablet, Take 1 tablet by mouth every 4 (four) hours as needed., Disp: , Rfl:     LORazepam (ATIVAN) 1 MG tablet, Take 1 mg by mouth nightly as needed., Disp: , Rfl: 0    methocarbamoL (ROBAXIN) 500 MG Tab, Take 1,000 mg by mouth 4 (four) times daily as needed., Disp: , Rfl:     ondansetron (ZOFRAN) 4 MG tablet, Take 1 tablet (4 mg total) by mouth every 6 (six) hours., Disp: 12 tablet, Rfl: 0    pantoprazole (PROTONIX) 40 MG tablet, Take 40 mg by mouth every morning., Disp: , Rfl:     rosuvastatin (CRESTOR) 20 MG tablet, Take 1 tablet  (20 mg total) by mouth once daily., Disp: 90 tablet, Rfl: 3    amitriptyline (ELAVIL) 25 MG tablet, Take 1 tablet (25 mg total) by mouth nightly as needed for Insomnia or Pain. (Patient not taking: Reported on 2023), Disp: 90 tablet, Rfl: 1    carisoprodoL (SOMA) 350 MG tablet, Take 350 mg by mouth 3 (three) times daily., Disp: , Rfl:     cetirizine (ZYRTEC) 10 MG tablet, Take 10 mg by mouth as needed.  , Disp: , Rfl:     chlorhexidine (PERIDEX) 0.12 % solution, SMARTSI Teaspoon By Mouth 3 Times Daily, Disp: , Rfl:     cholecalciferol, vitamin D3, 50,000 unit capsule, TK 1 C PO WEEKLY, Disp: , Rfl: 0    CONTRAVE 8-90 mg TbSR, TK 2 TS PO BID, Disp: , Rfl: 3    DULoxetine (CYMBALTA) 30 MG capsule, Take 30 mg by mouth., Disp: , Rfl:     ELIQUIS 5 mg Tab, TK 1 T PO  BID, Disp: , Rfl: 3    ergocalciferol, vitamin D2, (VITAMIN D ORAL), Take by mouth., Disp: , Rfl:     flecainide (TAMBOCOR) 100 MG Tab, TK 1 T PO BID, Disp: , Rfl: 3    LIVALO 2 mg Tab tablet, Take 2 mg by mouth once daily., Disp: , Rfl:     metoprolol anderson-hydrochlorothiaz 25-12.5 mg Tb24, metoprolol succ 25 mg-hydrochlorothiazide 12.5 mg tablet,ext.rel 24 hr  Take 1 tablet every day by oral route., Disp: , Rfl:     metoprolol succinate (TOPROL-XL) 25 MG 24 hr tablet, TK 1 T PO QD, Disp: , Rfl: 0    METOPROLOL SUCCINATE ORAL, Take 25 mg by mouth daily as needed., Disp: , Rfl: 2    oxyCODONE-acetaminophen (PERCOCET) 5-325 mg per tablet, Take 1 tablet by mouth every 8 (eight) hours as needed., Disp: , Rfl:     ranitidine (ZANTAC) 150 MG tablet, TAKE 1 TABLET(150 MG) BY MOUTH TWICE DAILY (Patient not taking: Reported on 2022), Disp: 60 tablet, Rfl: 0     Objective:         Vitals:    23 1315   BP: 121/70   Pulse: 68     Physical Exam  Obese   Can make fist, no synovitis  [place]ber[place] and stephanie nodes  Wrists, Elbows, Shoulder ROM ok  Left hip ROM limited with internal rotation and pain in groin, right hip ok  Knee crepitus  Negative  ankle/MTP  No tender points     Reviewed old and all outside pertinent medical records available.    All lab results personally reviewed and interpreted by me.  Lab Results   Component Value Date    WBC 13.17 (H) 05/30/2023    HGB 16.6 05/30/2023    HCT 47.5 05/30/2023    MCV 87 05/30/2023    MCH 30.3 05/30/2023    MCHC 34.9 05/30/2023    RDW 13.2 05/30/2023     05/30/2023    MPV 9.7 05/30/2023    PLTEST Normal 12/09/2011       Lab Results   Component Value Date     05/30/2023    K 4.2 05/30/2023     05/30/2023    CO2 22 (L) 05/30/2023     (H) 05/30/2023    BUN 20 05/30/2023    CALCIUM 9.8 05/30/2023    PROT 7.6 05/30/2023    ALBUMIN 4.5 05/30/2023    BILITOT 1.4 (H) 05/30/2023    AST 19 05/30/2023    ALKPHOS 58 05/30/2023    ALT 27 05/30/2023       Lab Results   Component Value Date    COLORU Yellow 05/30/2023    APPEARANCEUA Clear 05/30/2023    SPECGRAV 1.015 05/30/2023    PHUR >8.0 (A) 05/30/2023    PROTEINUA 1+ (A) 05/30/2023    KETONESU 1+ (A) 05/30/2023    LEUKOCYTESUR Negative 05/30/2023    NITRITE Negative 05/30/2023    UROBILINOGEN Negative 05/30/2023       Lab Results   Component Value Date    CRP 24.0 (H) 11/18/2011       Lab Results   Component Value Date    SEDRATE 15 (H) 11/18/2011       Lab Results   Component Value Date    ERICH Negative <1:160 02/24/2014    RF <10.0 02/24/2014    SEDRATE 15 (H) 11/18/2011       No components found for: 25OHVITDTOT, 34WMSAQF6, 99WMKLUQ3, METHODNOTE    Lab Results   Component Value Date    URICACID 6.9 11/17/2011       No components found for: TSPOTTB        Imaging:  All imaging reviewed and independently interpreted by me.         ASSESSMENT / PLAN:     Gonzalez Lockhart is a 62 y.o. White male with:      1. Positive ERICH (antinuclear antibody)  - ERICH 1:40, Nuclear/Speckled, no reflex/JOHANN  - discussed results  - I believe this to be false positive test, I do not see evidence of CTD, but will get labs as below   - discussed symptoms of CTD  -  reassurance   - C-Reactive Protein; Future  - ERICH Screen w/Reflex; Future  - Rheumatoid Factor; Future  - Sjogrens syndrome-B extractable nuclear antibody; Future  - CBC Auto Differential; Future  - Comprehensive Metabolic Panel; Future  - Cyclic Citrullinated Peptide Antibody, IgG; Future  - Sjogrens syndrome-A extractable nuclear antibody; Future  - Sedimentation rate; Future  - Vitamin D; Future  - Uric Acid; Future  - TSH; Future  - CK; Future  - Aldolase; Future  - XR Arthritis Survey; Future    2. Primary osteoarthritis involving multiple joints  - patients joint pain likely OA, does have possibility hip impingement   - discussed diagnosis and management  - avoid NSAID due to GI issues  - Norco PRN  - wt loss  - reassurance and exercise     3. Other specified counseling  - over 10 minutes spent regarding below topics:  - Immunization counseling done.  - Weight loss counseling done.  - Nutrition and exercise counseling.  - Limitation of alcohol consumption.  - Regular exercise:  Aerobic and resistance.  - Medication counseling provided.    4. Morbid Obesity  - would benefit from decreasing at least 10% of body weight.  - recommended goal of losing 1 lb per week.  - consider nutritionist evaluation.      Follow up in about 6 months (around 12/22/2023).    Method of contact with patient concerns: [facility]    Disclaimer:  This note is prepared using voice recognition software and as such is likely to have errors and has not been proof read. Please contact me for questions.     Time spent: 60 minutes in face to face discussion concerning diagnosis, prognosis, review of lab and test results, benefits of treatment as well as management of disease, counseling of patient and coordination of care between various health care providers.  Greater than half the time spent was used for coordination of care and counseling of patient.    Danyel Mckeon M.D.  Rheumatology Department   [facility]

## 2023-06-23 ENCOUNTER — HOSPITAL ENCOUNTER (OUTPATIENT)
Dept: RADIOLOGY | Facility: HOSPITAL | Age: 63
Discharge: HOME | End: 2023-06-23
Attending: INTERNAL MEDICINE
Payer: COMMERCIAL

## 2023-06-23 DIAGNOSIS — R76.8 POSITIVE ANA (ANTINUCLEAR ANTIBODY): ICD-10-CM

## 2023-06-23 PROCEDURE — 77077 JOINT SURVEY SINGLE VIEW: CPT | Mod: 26,,, | Performed by: RADIOLOGY

## 2023-06-23 PROCEDURE — 77077 XR ARTHRITIS SURVEY: ICD-10-PCS | Mod: 26,,, | Performed by: RADIOLOGY

## 2023-06-23 PROCEDURE — 77077 JOINT SURVEY SINGLE VIEW: CPT | Mod: TC

## 2023-07-14 ENCOUNTER — PATIENT MESSAGE (OUTPATIENT)
Dept: RHEUMATOLOGY | Facility: CLINIC | Age: 63
End: 2023-07-14
Payer: COMMERCIAL

## 2023-09-20 ENCOUNTER — PATIENT MESSAGE (OUTPATIENT)
Dept: RHEUMATOLOGY | Facility: CLINIC | Age: 63
End: 2023-09-20
Payer: COMMERCIAL

## 2023-09-20 ENCOUNTER — PATIENT MESSAGE (OUTPATIENT)
Dept: INTERNAL MEDICINE | Facility: CLINIC | Age: 63
End: 2023-09-20
Payer: COMMERCIAL

## 2023-09-20 DIAGNOSIS — R89.9 ABNORMAL LABORATORY TEST: Primary | ICD-10-CM

## 2023-09-20 DIAGNOSIS — Z00.00 PHYSICAL EXAM, ANNUAL: Primary | ICD-10-CM

## 2023-09-20 DIAGNOSIS — F43.9 STRESS: ICD-10-CM

## 2023-09-20 DIAGNOSIS — I48.0 PAROXYSMAL ATRIAL FIBRILLATION: ICD-10-CM

## 2023-09-22 ENCOUNTER — LAB VISIT (OUTPATIENT)
Dept: LAB | Facility: HOSPITAL | Age: 63
End: 2023-09-22
Attending: STUDENT IN AN ORGANIZED HEALTH CARE EDUCATION/TRAINING PROGRAM
Payer: COMMERCIAL

## 2023-09-22 DIAGNOSIS — I48.0 PAROXYSMAL ATRIAL FIBRILLATION: ICD-10-CM

## 2023-09-22 DIAGNOSIS — F43.9 STRESS: ICD-10-CM

## 2023-09-22 DIAGNOSIS — Z00.00 PHYSICAL EXAM, ANNUAL: ICD-10-CM

## 2023-09-22 LAB
25(OH)D3+25(OH)D2 SERPL-MCNC: 35 NG/ML (ref 30–96)
ALBUMIN SERPL BCP-MCNC: 4 G/DL (ref 3.5–5.2)
ALP SERPL-CCNC: 66 U/L (ref 55–135)
ALT SERPL W/O P-5'-P-CCNC: 22 U/L (ref 10–44)
ANION GAP SERPL CALC-SCNC: 10 MMOL/L (ref 8–16)
AST SERPL-CCNC: 15 U/L (ref 10–40)
BASOPHILS # BLD AUTO: 0.02 K/UL (ref 0–0.2)
BASOPHILS NFR BLD: 0.2 % (ref 0–1.9)
BILIRUB SERPL-MCNC: 0.6 MG/DL (ref 0.1–1)
BUN SERPL-MCNC: 15 MG/DL (ref 8–23)
CALCIUM SERPL-MCNC: 9.3 MG/DL (ref 8.7–10.5)
CHLORIDE SERPL-SCNC: 107 MMOL/L (ref 95–110)
CHOLEST SERPL-MCNC: 153 MG/DL (ref 120–199)
CHOLEST/HDLC SERPL: 3.3 {RATIO} (ref 2–5)
CO2 SERPL-SCNC: 22 MMOL/L (ref 23–29)
COMPLEXED PSA SERPL-MCNC: 1.5 NG/ML (ref 0–4)
CREAT SERPL-MCNC: 1 MG/DL (ref 0.5–1.4)
DIFFERENTIAL METHOD: NORMAL
EOSINOPHIL # BLD AUTO: 0.2 K/UL (ref 0–0.5)
EOSINOPHIL NFR BLD: 1.8 % (ref 0–8)
ERYTHROCYTE [DISTWIDTH] IN BLOOD BY AUTOMATED COUNT: 13.4 % (ref 11.5–14.5)
EST. GFR  (NO RACE VARIABLE): >60 ML/MIN/1.73 M^2
ESTIMATED AVG GLUCOSE: 108 MG/DL (ref 68–131)
GLUCOSE SERPL-MCNC: 94 MG/DL (ref 70–110)
HBA1C MFR BLD: 5.4 % (ref 4–5.6)
HCT VFR BLD AUTO: 45.9 % (ref 40–54)
HDLC SERPL-MCNC: 47 MG/DL (ref 40–75)
HDLC SERPL: 30.7 % (ref 20–50)
HGB BLD-MCNC: 15.6 G/DL (ref 14–18)
IMM GRANULOCYTES # BLD AUTO: 0.02 K/UL (ref 0–0.04)
IMM GRANULOCYTES NFR BLD AUTO: 0.2 % (ref 0–0.5)
LDLC SERPL CALC-MCNC: 94.2 MG/DL (ref 63–159)
LYMPHOCYTES # BLD AUTO: 2.6 K/UL (ref 1–4.8)
LYMPHOCYTES NFR BLD: 30.6 % (ref 18–48)
MCH RBC QN AUTO: 30.1 PG (ref 27–31)
MCHC RBC AUTO-ENTMCNC: 34 G/DL (ref 32–36)
MCV RBC AUTO: 88 FL (ref 82–98)
MONOCYTES # BLD AUTO: 0.8 K/UL (ref 0.3–1)
MONOCYTES NFR BLD: 9.6 % (ref 4–15)
NEUTROPHILS # BLD AUTO: 4.9 K/UL (ref 1.8–7.7)
NEUTROPHILS NFR BLD: 57.6 % (ref 38–73)
NONHDLC SERPL-MCNC: 106 MG/DL
NRBC BLD-RTO: 0 /100 WBC
PLATELET # BLD AUTO: 219 K/UL (ref 150–450)
PMV BLD AUTO: 9.2 FL (ref 9.2–12.9)
POTASSIUM SERPL-SCNC: 4.2 MMOL/L (ref 3.5–5.1)
PROT SERPL-MCNC: 7.3 G/DL (ref 6–8.4)
RBC # BLD AUTO: 5.19 M/UL (ref 4.6–6.2)
SODIUM SERPL-SCNC: 139 MMOL/L (ref 136–145)
T4 FREE SERPL-MCNC: 0.89 NG/DL (ref 0.71–1.51)
TRIGL SERPL-MCNC: 59 MG/DL (ref 30–150)
TSH SERPL DL<=0.005 MIU/L-ACNC: 1.63 UIU/ML (ref 0.4–4)
WBC # BLD AUTO: 8.54 K/UL (ref 3.9–12.7)

## 2023-09-22 PROCEDURE — 80053 COMPREHEN METABOLIC PANEL: CPT | Performed by: STUDENT IN AN ORGANIZED HEALTH CARE EDUCATION/TRAINING PROGRAM

## 2023-09-22 PROCEDURE — 83036 HEMOGLOBIN GLYCOSYLATED A1C: CPT | Performed by: STUDENT IN AN ORGANIZED HEALTH CARE EDUCATION/TRAINING PROGRAM

## 2023-09-22 PROCEDURE — 84439 ASSAY OF FREE THYROXINE: CPT | Performed by: STUDENT IN AN ORGANIZED HEALTH CARE EDUCATION/TRAINING PROGRAM

## 2023-09-22 PROCEDURE — 80061 LIPID PANEL: CPT | Performed by: STUDENT IN AN ORGANIZED HEALTH CARE EDUCATION/TRAINING PROGRAM

## 2023-09-22 PROCEDURE — 85025 COMPLETE CBC W/AUTO DIFF WBC: CPT | Performed by: STUDENT IN AN ORGANIZED HEALTH CARE EDUCATION/TRAINING PROGRAM

## 2023-09-22 PROCEDURE — 82306 VITAMIN D 25 HYDROXY: CPT | Performed by: STUDENT IN AN ORGANIZED HEALTH CARE EDUCATION/TRAINING PROGRAM

## 2023-09-22 PROCEDURE — 84153 ASSAY OF PSA TOTAL: CPT | Performed by: STUDENT IN AN ORGANIZED HEALTH CARE EDUCATION/TRAINING PROGRAM

## 2023-09-22 PROCEDURE — 84443 ASSAY THYROID STIM HORMONE: CPT | Performed by: STUDENT IN AN ORGANIZED HEALTH CARE EDUCATION/TRAINING PROGRAM

## 2023-10-01 ENCOUNTER — HOSPITAL ENCOUNTER (EMERGENCY)
Facility: HOSPITAL | Age: 63
Discharge: HOME | End: 2023-10-01
Attending: EMERGENCY MEDICINE
Payer: COMMERCIAL

## 2023-10-01 VITALS
DIASTOLIC BLOOD PRESSURE: 79 MMHG | HEIGHT: 70 IN | SYSTOLIC BLOOD PRESSURE: 130 MMHG | RESPIRATION RATE: 20 BRPM | TEMPERATURE: 98 F | BODY MASS INDEX: 31.5 KG/M2 | OXYGEN SATURATION: 96 % | HEART RATE: 60 BPM | WEIGHT: 220 LBS

## 2023-10-01 DIAGNOSIS — L73.9 FOLLICULITIS: Primary | ICD-10-CM

## 2023-10-01 PROCEDURE — 90715 TDAP VACCINE 7 YRS/> IM: CPT | Performed by: NURSE PRACTITIONER

## 2023-10-01 PROCEDURE — 90471 IMMUNIZATION ADMIN: CPT | Performed by: NURSE PRACTITIONER

## 2023-10-01 PROCEDURE — 63600175 PHARM REV CODE 636 W HCPCS: Performed by: NURSE PRACTITIONER

## 2023-10-01 PROCEDURE — 99284 EMERGENCY DEPT VISIT MOD MDM: CPT

## 2023-10-01 PROCEDURE — 25000003 PHARM REV CODE 250: Performed by: NURSE PRACTITIONER

## 2023-10-01 RX ORDER — SULFAMETHOXAZOLE AND TRIMETHOPRIM 800; 160 MG/1; MG/1
1 TABLET ORAL
Status: COMPLETED | OUTPATIENT
Start: 2023-10-01 | End: 2023-10-01

## 2023-10-01 RX ORDER — ERYTHROMYCIN 5 MG/G
OINTMENT OPHTHALMIC
Qty: 3.5 G | Refills: 3 | Status: SHIPPED | OUTPATIENT
Start: 2023-10-01 | End: 2023-10-08

## 2023-10-01 RX ORDER — SULFAMETHOXAZOLE AND TRIMETHOPRIM 800; 160 MG/1; MG/1
1 TABLET ORAL 2 TIMES DAILY
Qty: 20 TABLET | Refills: 0 | Status: SHIPPED | OUTPATIENT
Start: 2023-10-01 | End: 2023-10-11

## 2023-10-01 RX ADMIN — SULFAMETHOXAZOLE AND TRIMETHOPRIM 1 TABLET: 800; 160 TABLET ORAL at 10:10

## 2023-10-01 RX ADMIN — TETANUS TOXOID, REDUCED DIPHTHERIA TOXOID AND ACELLULAR PERTUSSIS VACCINE, ADSORBED 0.5 ML: 5; 2.5; 8; 8; 2.5 SUSPENSION INTRAMUSCULAR at 10:10

## 2023-10-01 NOTE — DISCHARGE INSTRUCTIONS
Rest, increase fluids, lots of water and liquids.  Call office for recheck.  Return as needed.  Warm compresses several times a day.

## 2023-10-01 NOTE — ED PROVIDER NOTES
"Encounter Date: 10/1/2023       History     Chief Complaint   Patient presents with    Eye Swelling      R eye swelling x 3 days. Reports itching and discharge. Denies vision changes.     POV the ED alone.  Patient complains of irritation and redness to the right upper eyelid.  States that he cut the grass several days ago.  Onset of irritation 3 days ago.  It has been itching at times.  He awoke this morning with redness and puffiness to the right eyelid.  Denies vision changes.  No contact use, no other complaints. Patient does states that he is allergic to iodine, he has been eating seafood. He states that when he does, he can develop food allergy symptoms. States the eyelid redness is different from his normal food allergies.    The history is provided by the patient.     Review of patient's allergies indicates:   Allergen Reactions    Nitroglycerin Other (See Comments)     Other reaction(s): flat lined  "bottomed out and flat lined" per pt.   "bottomed out and flat lined" per pt.       Iodinated contrast media Itching and Rash    Adhesive     Fish containing products     Iodine Itching    Iodine and iodide containing products     Latex, natural rubber      Other reaction(s): Rash    Amoxicillin Rash    Onion Nausea Only and Other (See Comments)    Tree pollen-black walnut Itching     Past Medical History:   Diagnosis Date    A-fib     Vitamin D deficiency      Past Surgical History:   Procedure Laterality Date    COLONOSCOPY N/A 7/5/2018    Procedure: COLONOSCOPY; rubber band ligation of hemorrhoids;  Surgeon: Kennedy Marr MD;  Location: [address];  Service: Endoscopy;  Laterality: N/A;     No family history on file.  Social History     Tobacco Use    Smoking status: Never    Smokeless tobacco: Never   Substance Use Topics    Alcohol use: Yes     Comment: Rarely    Drug use: No     Review of Systems   Constitutional:  Negative for chills and fatigue.   Eyes:  Positive for discharge and " itching.        Right eyelid puffiness with redness   Respiratory:  Negative for cough and shortness of breath.    Cardiovascular:  Negative for chest pain.   All other systems reviewed and are negative.      Physical Exam     Initial Vitals   BP Pulse Resp Temp SpO2   10/01/23 0926 10/01/23 0924 10/01/23 0924 10/01/23 0924 10/01/23 0924   130/79 60 20 98 °F (36.7 °C) 96 %      MAP       --                Physical Exam    Nursing note and vitals reviewed.  Constitutional: He appears well-developed and well-nourished. No distress.   HENT:   Head: Normocephalic and atraumatic.   Mouth/Throat: Oropharynx is clear and moist.   Eyes: EOM are normal. Pupils are equal, round, and reactive to light.   Noted puffiness to the right eyelid.  Mild erythema.  No induration.  No cellulitis.  No abscess. Noting small pimple to the right eyelid as well as one in the right eyelash.  Left eye within normal limits normal EOMs.  No pain with EOMs. No drainage or tearing at present time. Patient does wear CPAP at night. Thinking he could have scratched the right eyelid area. No appreciated abrasion.           ED Course   Procedures  Labs Reviewed - No data to display       Imaging Results    None          Medications   Tdap (BOOSTRIX) vaccine injection 0.5 mL (0.5 mLs Intramuscular Given 10/1/23 1020)   sulfamethoxazole-trimethoprim 800-160mg per tablet 1 tablet (1 tablet Oral Given 10/1/23 1020)     Medical Decision Making  Presents for evaluation of right eyelid irritation and redness.  Disposition pending  Differentials including but not limited to abscess, cellulitis, folliculitis, drug allergy  Plan of care, patient will be discharged home, diagnosis folliculitis.  No signs of abscess or cellulitis.  Bactrim for home use, 1st dose given in the ED. to continue warm compresses.  Follow up with his clinic when he gets home.  Return as needed.  He agrees with plan of care. Tetanus given. Tylenol/motrin as needed if not  contraindicated.    Risk  OTC drugs.  Prescription drug management.                               Clinical Impression:   Final diagnoses:  [L73.9] Folliculitis (Primary)        ED Disposition Condition    Discharge Stable          ED Prescriptions       Medication Sig Dispense Start Date End Date Auth. Provider    sulfamethoxazole-trimethoprim 800-160mg (BACTRIM DS) 800-160 mg Tab Take 1 tablet by mouth 2 (two) times daily. for 10 days 20 tablet 10/1/2023 10/11/2023 Ashley Sanchez NP    erythromycin (ROMYCIN) ophthalmic ointment Place into the left eye 5 (five) times daily. for 7 days 3.5 g 10/1/2023 10/8/2023 Ashley Sanchez NP          Follow-up Information       Follow up With Specialties Details Why Contact Info    Jina Escamilla MD Family Medicine Call in 3 days for office recheck [address] [facility]  [place] [address]  [phone]               Ashley Sanchez NP  10/01/23 1024

## 2023-10-02 ENCOUNTER — OFFICE VISIT (OUTPATIENT)
Dept: INTERNAL MEDICINE | Facility: CLINIC | Age: 63
End: 2023-10-02
Payer: COMMERCIAL

## 2023-10-02 VITALS
HEART RATE: 70 BPM | SYSTOLIC BLOOD PRESSURE: 130 MMHG | WEIGHT: 222 LBS | BODY MASS INDEX: 31.78 KG/M2 | DIASTOLIC BLOOD PRESSURE: 80 MMHG | OXYGEN SATURATION: 98 % | TEMPERATURE: 99 F | HEIGHT: 70 IN | RESPIRATION RATE: 17 BRPM

## 2023-10-02 DIAGNOSIS — M46.1 SACROILIITIS, NOT ELSEWHERE CLASSIFIED: ICD-10-CM

## 2023-10-02 DIAGNOSIS — Z78.9 STATIN INTOLERANCE: ICD-10-CM

## 2023-10-02 DIAGNOSIS — D68.318 OTHER HEMORRHAGIC DISORDER DUE TO INTRINSIC CIRCULATING ANTICOAGULANTS, ANTIBODIES, OR INHIBITORS: ICD-10-CM

## 2023-10-02 DIAGNOSIS — I48.0 PAROXYSMAL ATRIAL FIBRILLATION: ICD-10-CM

## 2023-10-02 DIAGNOSIS — I70.0 ATHEROSCLEROSIS OF AORTA: ICD-10-CM

## 2023-10-02 DIAGNOSIS — Z00.00 PHYSICAL EXAM, ANNUAL: Primary | ICD-10-CM

## 2023-10-02 DIAGNOSIS — E66.01 MORBID (SEVERE) OBESITY DUE TO EXCESS CALORIES: ICD-10-CM

## 2023-10-02 PROCEDURE — 3079F PR MOST RECENT DIASTOLIC BLOOD PRESSURE 80-89 MM HG: ICD-10-PCS | Mod: CPTII,S$GLB,, | Performed by: STUDENT IN AN ORGANIZED HEALTH CARE EDUCATION/TRAINING PROGRAM

## 2023-10-02 PROCEDURE — 99396 PR PREVENTIVE VISIT,EST,40-64: ICD-10-PCS | Mod: S$GLB,,, | Performed by: STUDENT IN AN ORGANIZED HEALTH CARE EDUCATION/TRAINING PROGRAM

## 2023-10-02 PROCEDURE — 99396 PREV VISIT EST AGE 40-64: CPT | Mod: S$GLB,,, | Performed by: STUDENT IN AN ORGANIZED HEALTH CARE EDUCATION/TRAINING PROGRAM

## 2023-10-02 PROCEDURE — 3008F BODY MASS INDEX DOCD: CPT | Mod: CPTII,S$GLB,, | Performed by: STUDENT IN AN ORGANIZED HEALTH CARE EDUCATION/TRAINING PROGRAM

## 2023-10-02 PROCEDURE — 99999 PR PBB SHADOW E&M-EST. PATIENT-LVL V: CPT | Mod: PBBFAC,,, | Performed by: STUDENT IN AN ORGANIZED HEALTH CARE EDUCATION/TRAINING PROGRAM

## 2023-10-02 PROCEDURE — 3008F PR BODY MASS INDEX (BMI) DOCUMENTED: ICD-10-PCS | Mod: CPTII,S$GLB,, | Performed by: STUDENT IN AN ORGANIZED HEALTH CARE EDUCATION/TRAINING PROGRAM

## 2023-10-02 PROCEDURE — 99999 PR PBB SHADOW E&M-EST. PATIENT-LVL V: ICD-10-PCS | Mod: PBBFAC,,, | Performed by: STUDENT IN AN ORGANIZED HEALTH CARE EDUCATION/TRAINING PROGRAM

## 2023-10-02 PROCEDURE — 1159F PR MEDICATION LIST DOCUMENTED IN MEDICAL RECORD: ICD-10-PCS | Mod: CPTII,S$GLB,, | Performed by: STUDENT IN AN ORGANIZED HEALTH CARE EDUCATION/TRAINING PROGRAM

## 2023-10-02 PROCEDURE — 3079F DIAST BP 80-89 MM HG: CPT | Mod: CPTII,S$GLB,, | Performed by: STUDENT IN AN ORGANIZED HEALTH CARE EDUCATION/TRAINING PROGRAM

## 2023-10-02 PROCEDURE — 3075F PR MOST RECENT SYSTOLIC BLOOD PRESS GE 130-139MM HG: ICD-10-PCS | Mod: CPTII,S$GLB,, | Performed by: STUDENT IN AN ORGANIZED HEALTH CARE EDUCATION/TRAINING PROGRAM

## 2023-10-02 PROCEDURE — 1159F MED LIST DOCD IN RCRD: CPT | Mod: CPTII,S$GLB,, | Performed by: STUDENT IN AN ORGANIZED HEALTH CARE EDUCATION/TRAINING PROGRAM

## 2023-10-02 PROCEDURE — 3075F SYST BP GE 130 - 139MM HG: CPT | Mod: CPTII,S$GLB,, | Performed by: STUDENT IN AN ORGANIZED HEALTH CARE EDUCATION/TRAINING PROGRAM

## 2023-10-02 PROCEDURE — 3044F PR MOST RECENT HEMOGLOBIN A1C LEVEL <7.0%: ICD-10-PCS | Mod: CPTII,S$GLB,, | Performed by: STUDENT IN AN ORGANIZED HEALTH CARE EDUCATION/TRAINING PROGRAM

## 2023-10-02 PROCEDURE — 3044F HG A1C LEVEL LT 7.0%: CPT | Mod: CPTII,S$GLB,, | Performed by: STUDENT IN AN ORGANIZED HEALTH CARE EDUCATION/TRAINING PROGRAM

## 2023-10-02 RX ORDER — HYDROCODONE BITARTRATE AND ACETAMINOPHEN 5; 325 MG/1; MG/1
1 TABLET ORAL 2 TIMES DAILY PRN
COMMUNITY
Start: 2023-07-21

## 2023-10-02 NOTE — PROGRESS NOTES
Subjective:      Chief Complaint: Annual Exam (Was in emergency on yesterday)    HPI  Mr. Gonzalez Lockhart is a [age] yo M with L-sided CNVI palsy, chronic allergic rhinitis, Afib (formerly on Metoprolol, flecainide, and Eliquis; now s/p ablation), VitD def, GERD, obstructive sleep apnea, sacroiliitis/chronic back pain (following with both outside Ortho and Pain), and Hx of food allergies presenting for annual physical; of note, was evaluated via the ED of right upper eyelid pain/swelling yesterday (treated with combination erythromycin/Bactrim drops).      Annual screening labs gathered in preparation for this appointment:  -vitamin-D, A1c, PSA, TSH/T4, CBC, and lipid panel:  Within normal limits   -CMP:  Negligible CO2 deficiency only    The 10-year ASCVD risk score (Nilda PIERCE, et al., 2019) is: 8.7%    Values used to calculate the score:      Age: 63 years      Sex: Male      Is Non- : No      Diabetic: No      Tobacco smoker: No      Systolic Blood Pressure: 130 mmHg      Is BP treated: No      HDL Cholesterol: 47 mg/dL      Total Cholesterol: 153 mg/dL      Family, social, surgical Hx reviewed     Health Maintenance:  Due for routine vaccinations only    Past Medical History:   Diagnosis Date    A-fib     Vitamin D deficiency      Past Surgical History:   Procedure Laterality Date    COLONOSCOPY N/A 7/5/2018    Procedure: COLONOSCOPY; rubber band ligation of hemorrhoids;  Surgeon: Kennedy Marr MD;  Location: [address];  Service: Endoscopy;  Laterality: N/A;     No family history on file.  Social History     Socioeconomic History    Marital status:    Tobacco Use    Smoking status: Never    Smokeless tobacco: Never   Substance and Sexual Activity    Alcohol use: Yes     Comment: Rarely    Drug use: No    Sexual activity: Yes     Partners: Female     Review of patient's allergies indicates:   Allergen Reactions    Nitroglycerin Other (See Comments)     Other reaction(s):  "flat lined  "bottomed out and flat lined" per pt.   "bottomed out and flat lined" per pt.       Iodinated contrast media Itching and Rash    Adhesive     Fish containing products     Iodine Itching    Iodine and iodide containing products     Latex, natural rubber      Other reaction(s): Rash    Amoxicillin Rash    Onion Nausea Only and Other (See Comments)    Tree pollen-black walnut Itching     Gonzalez Lockhart had no medications administered during this visit.      Review of Systems   Constitutional:  Negative for appetite change, chills and fever.   HENT: Negative.     Respiratory:  Negative for cough, chest tightness and shortness of breath.    Cardiovascular:  Negative for chest pain, palpitations and leg swelling.   Gastrointestinal:  Negative for abdominal distention, abdominal pain, blood in stool, constipation, diarrhea, nausea and vomiting.   Endocrine: Negative.    Genitourinary:  Negative for difficulty urinating, dysuria, frequency and hematuria.   Musculoskeletal: Negative.    Integumentary:  Negative.   Neurological: Negative.    Psychiatric/Behavioral: Negative.           Objective:      Vitals:    10/02/23 1259   BP: 130/80   Pulse: 70   Resp: 17   Temp: 98.6 °F (37 °C)      Physical Exam  Vitals reviewed.   Constitutional:       General: He is not in acute distress.     Appearance: Normal appearance.   HENT:      Head: Normocephalic and atraumatic.      Comments: Facial features are symmetric      Nose: Nose normal. No congestion or rhinorrhea.      Mouth/Throat:      Mouth: Mucous membranes are moist.      Pharynx: Oropharynx is clear. No oropharyngeal exudate or posterior oropharyngeal erythema.   Eyes:      General: No scleral icterus.     Extraocular Movements: Extraocular movements intact.      Conjunctiva/sclera: Conjunctivae normal.   Cardiovascular:      Rate and Rhythm: Normal rate and regular rhythm.      Pulses: Normal pulses.      Heart sounds: Normal heart sounds.   Pulmonary:      Effort: " Pulmonary effort is normal. No respiratory distress.      Breath sounds: Normal breath sounds.   Musculoskeletal:         General: No deformity or signs of injury. Normal range of motion.      Cervical back: Normal range of motion.      Comments: Gait normal    Skin:     General: Skin is warm and dry.      Findings: No rash.   Neurological:      General: No focal deficit present.      Mental Status: He is alert and oriented to person, place, and time. Mental status is at baseline.   Psychiatric:         Mood and Affect: Mood normal.         Behavior: Behavior normal.         Thought Content: Thought content normal.       Current Outpatient Medications on File Prior to Visit   Medication Sig Dispense Refill    cetirizine (ZYRTEC) 10 MG tablet Take 10 mg by mouth as needed.        cholecalciferol, vitamin D3, 50,000 unit capsule TK 1 C PO WEEKLY  0    ELIQUIS 5 mg Tab TK 1 T PO  BID  3    ergocalciferol, vitamin D2, (VITAMIN D ORAL) Take by mouth.      erythromycin (ROMYCIN) ophthalmic ointment Place into the left eye 5 (five) times daily. for 7 days 3.5 g 3    fexofenadine HCl (ALLEGRA ORAL) Take by mouth.      flecainide (TAMBOCOR) 100 MG Tab TK 1 T PO BID  3    LORazepam (ATIVAN) 1 MG tablet Take 1 mg by mouth nightly as needed.  0    methocarbamoL (ROBAXIN) 500 MG Tab Take 1,000 mg by mouth 4 (four) times daily as needed.      metoprolol anderson-hydrochlorothiaz 25-12.5 mg Tb24 metoprolol succ 25 mg-hydrochlorothiazide 12.5 mg tablet,ext.rel 24 hr   Take 1 tablet every day by oral route.      METOPROLOL SUCCINATE ORAL Take 25 mg by mouth daily as needed.  2    pantoprazole (PROTONIX) 40 MG tablet Take 40 mg by mouth every morning.      sulfamethoxazole-trimethoprim 800-160mg (BACTRIM DS) 800-160 mg Tab Take 1 tablet by mouth 2 (two) times daily. for 10 days 20 tablet 0    [DISCONTINUED] HYDROcodone-acetaminophen (NORCO) 7.5-325 mg per tablet Take 1 tablet by mouth every 4 (four) hours as needed.      [DISCONTINUED]  rosuvastatin (CRESTOR) 20 MG tablet Take 1 tablet (20 mg total) by mouth once daily. 90 tablet 3    HYDROcodone-acetaminophen (NORCO) 5-325 mg per tablet Take 1 tablet by mouth 2 (two) times daily as needed.      [DISCONTINUED] carisoprodoL (SOMA) 350 MG tablet Take 350 mg by mouth 3 (three) times daily.      [DISCONTINUED] chlorhexidine (PERIDEX) 0.12 % solution SMARTSI Teaspoon By Mouth 3 Times Daily      [DISCONTINUED] CONTRAVE 8-90 mg TbSR TK 2 TS PO BID  3    [DISCONTINUED] DULoxetine (CYMBALTA) 30 MG capsule Take 30 mg by mouth.      [DISCONTINUED] LIVALO 2 mg Tab tablet Take 2 mg by mouth once daily.      [DISCONTINUED] metoprolol succinate (TOPROL-XL) 25 MG 24 hr tablet TK 1 T PO QD  0    [DISCONTINUED] ondansetron (ZOFRAN) 4 MG tablet Take 1 tablet (4 mg total) by mouth every 6 (six) hours. 12 tablet 0    [DISCONTINUED] oxyCODONE-acetaminophen (PERCOCET) 5-325 mg per tablet Take 1 tablet by mouth every 8 (eight) hours as needed.       No current facility-administered medications on file prior to visit.         Assessment:       1. Physical exam, annual    2. Statin intolerance    3. Morbid (severe) obesity due to excess calories    4. Sacroiliitis, not elsewhere classified    5. Other hemorrhagic disorder due to intrinsic circulating anticoagulants, antibodies, or inhibitors    6. Atherosclerosis of aorta        Plan:       Physical exam, annual   - annual labs ordered     Statin intolerance   - given 10 yr ASCVD risk and prior intolerance, would defer further Statin trials     Morbid (severe) obesity due to excess calories   - no effect on systemic organ function     Sacroiliitis, not elsewhere classified   - Will continue to follow with pain and ortho     Other hemorrhagic disorder due to intrinsic circulating anticoagulants, antibodies, or inhibitors   - Not currently on anticoagulation     Atherosclerosis of aorta   - modifiable risk factors are optimized

## 2023-10-03 DIAGNOSIS — I48.0 PAROXYSMAL ATRIAL FIBRILLATION: Primary | ICD-10-CM

## 2023-10-17 ENCOUNTER — OFFICE VISIT (OUTPATIENT)
Dept: ELECTROPHYSIOLOGY | Facility: CLINIC | Age: 63
End: 2023-10-17
Payer: COMMERCIAL

## 2023-10-17 ENCOUNTER — HOSPITAL ENCOUNTER (OUTPATIENT)
Dept: CARDIOLOGY | Facility: CLINIC | Age: 63
Discharge: HOME | End: 2023-10-17
Payer: COMMERCIAL

## 2023-10-17 VITALS
HEIGHT: 70 IN | DIASTOLIC BLOOD PRESSURE: 72 MMHG | WEIGHT: 225.75 LBS | BODY MASS INDEX: 32.32 KG/M2 | HEART RATE: 71 BPM | SYSTOLIC BLOOD PRESSURE: 138 MMHG

## 2023-10-17 DIAGNOSIS — I48.0 PAROXYSMAL ATRIAL FIBRILLATION: Primary | ICD-10-CM

## 2023-10-17 DIAGNOSIS — I48.0 PAROXYSMAL ATRIAL FIBRILLATION: ICD-10-CM

## 2023-10-17 PROBLEM — I70.0 ATHEROSCLEROSIS OF AORTA: Status: RESOLVED | Noted: 2023-10-02 | Resolved: 2023-10-17

## 2023-10-17 PROCEDURE — 99999 PR PBB SHADOW E&M-EST. PATIENT-LVL III: ICD-10-PCS | Mod: PBBFAC,,, | Performed by: INTERNAL MEDICINE

## 2023-10-17 PROCEDURE — 3075F PR MOST RECENT SYSTOLIC BLOOD PRESS GE 130-139MM HG: ICD-10-PCS | Mod: CPTII,S$GLB,, | Performed by: INTERNAL MEDICINE

## 2023-10-17 PROCEDURE — 1160F PR REVIEW ALL MEDS BY PRESCRIBER/CLIN PHARMACIST DOCUMENTED: ICD-10-PCS | Mod: CPTII,S$GLB,, | Performed by: INTERNAL MEDICINE

## 2023-10-17 PROCEDURE — 99999 PR PBB SHADOW E&M-EST. PATIENT-LVL III: CPT | Mod: PBBFAC,,, | Performed by: INTERNAL MEDICINE

## 2023-10-17 PROCEDURE — 3075F SYST BP GE 130 - 139MM HG: CPT | Mod: CPTII,S$GLB,, | Performed by: INTERNAL MEDICINE

## 2023-10-17 PROCEDURE — 1159F PR MEDICATION LIST DOCUMENTED IN MEDICAL RECORD: ICD-10-PCS | Mod: CPTII,S$GLB,, | Performed by: INTERNAL MEDICINE

## 2023-10-17 PROCEDURE — 3044F PR MOST RECENT HEMOGLOBIN A1C LEVEL <7.0%: ICD-10-PCS | Mod: CPTII,S$GLB,, | Performed by: INTERNAL MEDICINE

## 2023-10-17 PROCEDURE — 93010 RHYTHM STRIP: ICD-10-PCS | Mod: S$GLB,,, | Performed by: INTERNAL MEDICINE

## 2023-10-17 PROCEDURE — 3008F BODY MASS INDEX DOCD: CPT | Mod: CPTII,S$GLB,, | Performed by: INTERNAL MEDICINE

## 2023-10-17 PROCEDURE — 3078F PR MOST RECENT DIASTOLIC BLOOD PRESSURE < 80 MM HG: ICD-10-PCS | Mod: CPTII,S$GLB,, | Performed by: INTERNAL MEDICINE

## 2023-10-17 PROCEDURE — 3008F PR BODY MASS INDEX (BMI) DOCUMENTED: ICD-10-PCS | Mod: CPTII,S$GLB,, | Performed by: INTERNAL MEDICINE

## 2023-10-17 PROCEDURE — 99204 PR OFFICE/OUTPT VISIT, NEW, LEVL IV, 45-59 MIN: ICD-10-PCS | Mod: S$GLB,,, | Performed by: INTERNAL MEDICINE

## 2023-10-17 PROCEDURE — 1159F MED LIST DOCD IN RCRD: CPT | Mod: CPTII,S$GLB,, | Performed by: INTERNAL MEDICINE

## 2023-10-17 PROCEDURE — 93005 RHYTHM STRIP: ICD-10-PCS | Mod: S$GLB,,, | Performed by: INTERNAL MEDICINE

## 2023-10-17 PROCEDURE — 1160F RVW MEDS BY RX/DR IN RCRD: CPT | Mod: CPTII,S$GLB,, | Performed by: INTERNAL MEDICINE

## 2023-10-17 PROCEDURE — 3078F DIAST BP <80 MM HG: CPT | Mod: CPTII,S$GLB,, | Performed by: INTERNAL MEDICINE

## 2023-10-17 PROCEDURE — 93005 ELECTROCARDIOGRAM TRACING: CPT | Mod: S$GLB,,, | Performed by: INTERNAL MEDICINE

## 2023-10-17 PROCEDURE — 93010 ELECTROCARDIOGRAM REPORT: CPT | Mod: S$GLB,,, | Performed by: INTERNAL MEDICINE

## 2023-10-17 PROCEDURE — 99204 OFFICE O/P NEW MOD 45 MIN: CPT | Mod: S$GLB,,, | Performed by: INTERNAL MEDICINE

## 2023-10-17 PROCEDURE — 3044F HG A1C LEVEL LT 7.0%: CPT | Mod: CPTII,S$GLB,, | Performed by: INTERNAL MEDICINE

## 2023-10-17 RX ORDER — METOPROLOL SUCCINATE 25 MG/1
25 TABLET, EXTENDED RELEASE ORAL DAILY PRN
Qty: 30 TABLET | Refills: 11 | Status: SHIPPED | OUTPATIENT
Start: 2023-10-17 | End: 2024-10-16

## 2023-10-17 RX ORDER — APIXABAN 5 MG/1
5 TABLET, FILM COATED ORAL 2 TIMES DAILY
Qty: 60 TABLET | Refills: 11 | Status: SHIPPED | OUTPATIENT
Start: 2023-10-17

## 2023-10-17 RX ORDER — FLECAINIDE ACETATE 100 MG/1
100 TABLET ORAL EVERY 12 HOURS
Qty: 60 TABLET | Refills: 11 | Status: SHIPPED | OUTPATIENT
Start: 2023-10-17

## 2023-10-17 NOTE — PROGRESS NOTES
PCP - Jina Escamilla MD  Subjective:   Gonzalez Lockhart is a 58 y.o. male who presents for evaluation of AF.    HPI  I had the pleasure of seeing Mr. Lockhart in our electrophysiology clinic in consultation for his atrial fibrillation. As you are aware he is a pleasant 63 year-old man with symptomatic paroxysmal atrial fibrillation treated with flecainide and chronic idiopathic angioedema. He works in [place] however lives here in [place]. He began having periodic palpitations in 2015. He saw a cardiologist in [place] who ordered a 30 day monitor. While wearing it here he had an episode and went to [facility] ER. He was diagnosed with AF with RVR. He converted spontaneously and was initiated on lopressor. He saw an electrophysiologist in [place] who started him on flecainide. This kept him in sinus rhythm for a few years. However for the past 6 months he has had increasing paroxysms of AF with rates up to 130-150bpm and lasting 6-7 hours before converting. His electrophysiologist recommended a PVI. The patient is requesting a PVI, in particular cryo-PVI and would like someone that does a high-volume of them. He is not on anticoagulation or aspirin.     I reviewed available ECGs in Epic. An ECG dated 8/23/2015 shows AF with RVR. All other ECGs show sinus rhythm.     My interpretation of today's in clinic ECG is sinus rhythm with a ventricular rate of 65 bpm.    Interval history:  Last seen 5 years prior with plans to refer to Dr. Lord for cryo-PVI and lost to follow up. Had a [facility] 2019 following positive stress test which was nonobstructive CAD > class IC d/c'd and started on sotalol 40 mg BID. Since that time, he was followed by Dr. Zavala at .  He underwent cryo-PVI on 11/29/22, as well as a RF CTI. Was restarted on flecainide 100 mg BID post-ablation which was then d/c'd in follow up 2/23 with plans to take PRN.     Today, he reports feeling well overall. Occasionally he has palpitations and takes  "flecainide/metoprolol (~1x per month). No known recurrence of AF. CHADS-VASc 0, and he is not on OAC.    ECG personally reviewed which shows NSR at 71 bpm with normal intervals    History:     Social History     Tobacco Use    Smoking status: Never    Smokeless tobacco: Never   Substance Use Topics    Alcohol use: Yes     Comment: Rarely     History reviewed. No pertinent family history.    Meds:     Review of patient's allergies indicates:   Allergen Reactions    Nitroglycerin Other (See Comments)     Other reaction(s): flat lined  "bottomed out and flat lined" per pt.   "bottomed out and flat lined" per pt.       Iodinated contrast media Itching and Rash    Adhesive     Fish containing products     Iodine Itching    Iodine and iodide containing products     Latex, natural rubber      Other reaction(s): Rash    Amoxicillin Rash    Onion Nausea Only and Other (See Comments)    Tree pollen-black walnut Itching       Current Outpatient Medications:     cetirizine (ZYRTEC) 10 MG tablet, Take 10 mg by mouth as needed.  , Disp: , Rfl:     cholecalciferol, vitamin D3, 50,000 unit capsule, TK 1 C PO WEEKLY, Disp: , Rfl: 0    ELIQUIS 5 mg Tab, TK 1 T PO  BID, Disp: , Rfl: 3    ergocalciferol, vitamin D2, (VITAMIN D ORAL), Take by mouth., Disp: , Rfl:     fexofenadine HCl (ALLEGRA ORAL), Take by mouth., Disp: , Rfl:     flecainide (TAMBOCOR) 100 MG Tab, TK 1 T PO BID, Disp: , Rfl: 3    HYDROcodone-acetaminophen (NORCO) 5-325 mg per tablet, Take 1 tablet by mouth 2 (two) times daily as needed., Disp: , Rfl:     LORazepam (ATIVAN) 1 MG tablet, Take 1 mg by mouth nightly as needed., Disp: , Rfl: 0    methocarbamoL (ROBAXIN) 500 MG Tab, Take 1,000 mg by mouth 4 (four) times daily as needed., Disp: , Rfl:     metoprolol anderson-hydrochlorothiaz 25-12.5 mg Tb24, metoprolol succ 25 mg-hydrochlorothiazide 12.5 mg tablet,ext.rel 24 hr  Take 1 tablet every day by oral route., Disp: , Rfl:     METOPROLOL SUCCINATE ORAL, Take 25 mg by mouth " daily as needed., Disp: , Rfl: 2    pantoprazole (PROTONIX) 40 MG tablet, Take 40 mg by mouth every morning., Disp: , Rfl:     Constitution: Negative for fever or chills. Negative for weight loss or gain.   HENT: Negative for sore throat or headaches. Negative for rhinorrhea.  Eyes: Negative for blurred or double vision.   Cardiovascular: See above  Pulmonary: Negative for SOB. Negative for cough.   Gastrointestinal: Negative for abdominal pain. Negative for nausea/ vomiting. Negative for diarrhea.   : Negative for dysuria.   Neurological: Negative for focal weakness or sensory changes.    Objective:     Vitals:    10/17/23 1056   BP: 138/72   Pulse: 71     General: NAD. AAO  HENT: EOMI  Neck: No JVD  CV: RRR.   Resp: no increased WOB  Ext: No edema     Labs & Imaging   Reviewed in clinic today    Assessment:   Gonzalez Lockhart is a 63 y.o. y.o. male who presented today to re-establish care for atrial fibrillation. Underwent cryo-PVI and RF CTI 11/2022 with no known recurrence. CHADS-VASc 0 and is not on OAC. He takes flecainide PRN palpitations which is infrequent.     1. Paroxysmal atrial fibrillation  Ambulatory referral/consult to Cardiac Electrophysiology         Plan:     - Continue current regimen    Alexander Main MD, PGY8  Electrophysiology

## 2023-10-24 ENCOUNTER — PATIENT MESSAGE (OUTPATIENT)
Dept: INTERNAL MEDICINE | Facility: CLINIC | Age: 63
End: 2023-10-24
Payer: COMMERCIAL

## 2023-11-09 ENCOUNTER — OFFICE VISIT (OUTPATIENT)
Dept: OTOLARYNGOLOGY | Facility: CLINIC | Age: 63
End: 2023-11-09
Payer: COMMERCIAL

## 2023-11-09 VITALS — WEIGHT: 224 LBS | BODY MASS INDEX: 32.14 KG/M2

## 2023-11-09 DIAGNOSIS — G47.33 OSA (OBSTRUCTIVE SLEEP APNEA): ICD-10-CM

## 2023-11-09 DIAGNOSIS — J34.3 NASAL TURBINATE HYPERTROPHY: Primary | ICD-10-CM

## 2023-11-09 PROCEDURE — 1160F PR REVIEW ALL MEDS BY PRESCRIBER/CLIN PHARMACIST DOCUMENTED: ICD-10-PCS | Mod: S$GLB,,, | Performed by: OTOLARYNGOLOGY

## 2023-11-09 PROCEDURE — 1159F MED LIST DOCD IN RCRD: CPT | Mod: S$GLB,,, | Performed by: OTOLARYNGOLOGY

## 2023-11-09 PROCEDURE — 1159F PR MEDICATION LIST DOCUMENTED IN MEDICAL RECORD: ICD-10-PCS | Mod: S$GLB,,, | Performed by: OTOLARYNGOLOGY

## 2023-11-09 PROCEDURE — 1160F RVW MEDS BY RX/DR IN RCRD: CPT | Mod: S$GLB,,, | Performed by: OTOLARYNGOLOGY

## 2023-11-09 PROCEDURE — 99999 PR PBB SHADOW E&M-EST. PATIENT-LVL III: ICD-10-PCS | Mod: PBBFAC,,, | Performed by: OTOLARYNGOLOGY

## 2023-11-09 PROCEDURE — 99999 PR PBB SHADOW E&M-EST. PATIENT-LVL III: CPT | Mod: PBBFAC,,, | Performed by: OTOLARYNGOLOGY

## 2023-11-09 PROCEDURE — 3008F PR BODY MASS INDEX (BMI) DOCUMENTED: ICD-10-PCS | Mod: S$GLB,,, | Performed by: OTOLARYNGOLOGY

## 2023-11-09 PROCEDURE — 99203 PR OFFICE/OUTPT VISIT, NEW, LEVL III, 30-44 MIN: ICD-10-PCS | Mod: S$GLB,,, | Performed by: OTOLARYNGOLOGY

## 2023-11-09 PROCEDURE — 99203 OFFICE O/P NEW LOW 30 MIN: CPT | Mod: S$GLB,,, | Performed by: OTOLARYNGOLOGY

## 2023-11-09 PROCEDURE — 3044F HG A1C LEVEL LT 7.0%: CPT | Mod: S$GLB,,, | Performed by: OTOLARYNGOLOGY

## 2023-11-09 PROCEDURE — 3008F BODY MASS INDEX DOCD: CPT | Mod: S$GLB,,, | Performed by: OTOLARYNGOLOGY

## 2023-11-09 PROCEDURE — 3044F PR MOST RECENT HEMOGLOBIN A1C LEVEL <7.0%: ICD-10-PCS | Mod: S$GLB,,, | Performed by: OTOLARYNGOLOGY

## 2023-11-09 RX ORDER — AZELASTINE 1 MG/ML
SPRAY, METERED NASAL
Qty: 30 ML | Refills: 11 | Status: SHIPPED | OUTPATIENT
Start: 2023-11-09

## 2023-11-09 RX ORDER — FLUTICASONE PROPIONATE 50 MCG
2 SPRAY, SUSPENSION (ML) NASAL NIGHTLY
Qty: 15.8 ML | Refills: 11 | Status: SHIPPED | OUTPATIENT
Start: 2023-11-09 | End: 2024-11-08

## 2023-11-09 NOTE — PROGRESS NOTES
Subjective:       Patient ID: Gonzalez Lockhart is a 63 y.o. male.    Chief Complaint: Sinus Problem (Pt c/o congestion and clogged ears. )      This 63-year-old gentleman presents because he wears CPAP and has difficulty breathing through his nose at night especially and for the past two months perhaps he has been using Afrin.  He has a history of recent vogt with atrial fibrillation eventually corrected with an ablation procedure.  Historically he is seemed to have aggravation to his arrhythmia with steroid sprays and to oral steroids in particular.  He does not like Flonase but he is used it every other day still holding over some of the concerns when he used Flonase before his ablation and seemed to aggravate his AFib     He had some type of sinus and nasal surgery 30 years ago and about a year ago saw a physician who did an in office CT scan and scheduled him for various procedures that he did not pursue.    Sinus Problem          Objective:      ENT Physical Exam  Constitutional  Appearance: patient appears well-developed, well-nourished and well-groomed,  Communication/Voice: communication appropriate for developmental age; vocal quality normal;  Head and Face  Appearance: head appears normal, face appears normal and face appears atraumatic;  Salivary: glands normal;  Ear  Hearing: intact;  Auricles: right auricle normal; left auricle normal;  Ear Canals: right ear canal normal; left ear canal normal;  Tympanic Membranes: right tympanic membrane normal; left tympanic membrane normal;  Nose  External Nose: nares patent bilaterally; external nose normal;  Internal Nose: nasal mucosa normal; septum normal; bilateral inferior turbinates normal;  Nose comments: So his septum is nearly midline that is not perfectly clear that he had significant turbinate reduction but I only did anterior rhinoscopy.  He does not have any evidence of infection either on physical exam or by his story namely does not have much paranasal  sinus pain or infected appearing drainage.  Oral Cavity/Oropharynx  Lips: normal;  Teeth: normal;  Gums: gingiva normal;  Tongue: normal;  Oral mucosa: normal;  Hard palate: normal;  Soft palate: normal;  Tonsils: normal;  Base of Tongue: normal;  Posterior pharyngeal wall: normal;  Neck  Neck: neck normal; neck palpation normal;  Thyroid: thyroid normal;  Lymphatic  Palpation: lymph nodes normal;          Assessment:       1. Nasal turbinate hypertrophy    2. ZAYRA (obstructive sleep apnea)         Plan:          He is going to try and obtain the CT scan that the other physician did in his office so I can look at it and see what was identified that lead to a recommendation for significant surgery under anesthesia.      I have asked him to follow the advice that goes with my Afrin handout except for now we are going to skip Sudafed and steroids since he has had a history of arrhythmias and does seem to be sensitive to some medicines     Flonase and azelastine I hope will suffice and advice on how to get off of the Afrin.      Maybe a candidate for the Vivaer procedure however I would like to look at the CT scan 1st because apparently another ENT saw enough of an issue based on that is scan I presumed to recommend perhaps a bit more involved of a procedure.

## 2023-11-10 ENCOUNTER — PATIENT MESSAGE (OUTPATIENT)
Dept: INTERNAL MEDICINE | Facility: CLINIC | Age: 63
End: 2023-11-10
Payer: COMMERCIAL

## 2023-11-13 NOTE — TELEPHONE ENCOUNTER
Lov-10/02/2023  Pt reports that he has discontinued amitriptyline due to it not helping with pain and caused him to feel   lethargic and depressed  . He is asking to start back taking gabapentin .

## 2023-11-13 NOTE — TELEPHONE ENCOUNTER
I do not see gabapentin on his prior medication list.  Can we ask what his prior dose/frequency was versus ask him to reach out to his established pain management doctor (presumably who provided this in the 1st place).    Thank you for your time.

## 2023-11-17 ENCOUNTER — PATIENT MESSAGE (OUTPATIENT)
Dept: OTOLARYNGOLOGY | Facility: CLINIC | Age: 63
End: 2023-11-17
Payer: COMMERCIAL

## 2023-11-20 DIAGNOSIS — J32.9 CHRONIC SINUSITIS, UNSPECIFIED LOCATION: ICD-10-CM

## 2023-11-27 ENCOUNTER — HOSPITAL ENCOUNTER (OUTPATIENT)
Dept: RADIOLOGY | Facility: HOSPITAL | Age: 63
Discharge: HOME | End: 2023-11-27
Attending: OTOLARYNGOLOGY
Payer: COMMERCIAL

## 2023-11-27 DIAGNOSIS — J32.9 CHRONIC SINUSITIS, UNSPECIFIED LOCATION: ICD-10-CM

## 2023-11-27 PROCEDURE — 70486 CT SINUSES WITHOUT CONTRAST: ICD-10-PCS | Mod: 26,,, | Performed by: RADIOLOGY

## 2023-11-27 PROCEDURE — 70486 CT MAXILLOFACIAL W/O DYE: CPT | Mod: 26,,, | Performed by: RADIOLOGY

## 2023-11-27 PROCEDURE — 70486 CT MAXILLOFACIAL W/O DYE: CPT | Mod: TC

## 2024-01-23 ENCOUNTER — TELEPHONE (OUTPATIENT)
Dept: OTOLARYNGOLOGY | Facility: CLINIC | Age: 64
End: 2024-01-23
Payer: COMMERCIAL

## 2024-01-23 NOTE — TELEPHONE ENCOUNTER
----- Message from Josephine Nix LPN sent at 1/22/2024  5:33 PM CST -----    ----- Message -----  From: Priti Torres  Sent: 1/22/2024   2:37 PM CST  To: [facility] Staff    Type:  Needs Medical Advice    Who Called: pt    Would the patient rather a call back or a response via [facility]? call  Best Call Back Number: [phone]  Additional Information: pt requesting a call back to discuss Schedule Vivaer procedure.

## 2024-01-26 ENCOUNTER — OFFICE VISIT (OUTPATIENT)
Dept: OTOLARYNGOLOGY | Facility: CLINIC | Age: 64
End: 2024-01-26
Payer: COMMERCIAL

## 2024-01-26 VITALS — HEIGHT: 70 IN | BODY MASS INDEX: 32.21 KG/M2 | WEIGHT: 225 LBS

## 2024-01-26 DIAGNOSIS — J34.3 NASAL TURBINATE HYPERTROPHY: ICD-10-CM

## 2024-01-26 DIAGNOSIS — J34.89 NASAL OBSTRUCTION: Primary | ICD-10-CM

## 2024-01-26 DIAGNOSIS — M95.0 NASAL VALVE COLLAPSE: ICD-10-CM

## 2024-01-26 PROCEDURE — 30117 REMOVAL OF INTRANASAL LESION: CPT | Mod: S$GLB,,, | Performed by: OTOLARYNGOLOGY

## 2024-01-26 PROCEDURE — 99499 UNLISTED E&M SERVICE: CPT | Mod: S$GLB,,, | Performed by: OTOLARYNGOLOGY

## 2024-01-26 PROCEDURE — 30801 ABLATE INF TURBINATE SUPERF: CPT | Mod: 51,S$GLB,, | Performed by: OTOLARYNGOLOGY

## 2024-01-26 PROCEDURE — 99999 PR PBB SHADOW E&M-EST. PATIENT-LVL III: CPT | Mod: PBBFAC,,, | Performed by: OTOLARYNGOLOGY

## 2024-01-26 NOTE — PROGRESS NOTES
Subjective:       Patient ID: Gonzalez Lockhart is a 63 y.o. male.    Chief Complaint: Other (Pt is here for vivaer procedure )      HPI  This gentleman with primary complaint of nasal airway obstruction has been evaluated with CT scan and we have tried different sprays and he does get some benefit especially from azelastine he finds Flonase may seemed to aggravate his intermittent atrial fibrillation and he takes decongestants a bit more commonly than might be recommended in any case the CT did not show any chronic infection or blockage he does have some mild septal deviations but nothing dramatic he does have some turbinate hypertrophy and nasal valve collapse as evidenced both by his physical exam and response to the modified Marilyn maneuver.  He is here today for the Vivaer procedure to improve his nasal airway and perhaps improve his benefits from CPAP which he wears nightly          Objective:      ENT Physical Exam    Procedure:     After discussing the nature of the process with the patient again having already discussed it with him in the office in the past visit we anesthetize his nose with lidocaine and Afrin on cotton balls.  Once they had been given an opportunity to provide some topical anesthesia 1% lidocaine with epinephrine was injected into the anterior portion of the inferior turbinate and into the lateral nasal wall in the nasal valve area both the cutaneous and the mucosal aspects    The Vivaer device was then used on the right side to make a total of 7 lesions including as far back on the inferior turbinate as the anesthesia would allow and the nasal valve region the left side was addressed in similar fashion for a total of 4 lesion spread out with little overlap but juxtaposed to each other.    He tolerated this well a little bit of discomfort in the most posterior ones on the inferior turbinate as we got towards the edge of the anesthesia.            Assessment:       1. Nasal Valve collapse     2. Nasal turbinate hypertrophy    3. Nasal valve collapse         Plan:          We discussed the postoperative expectations and I will see him next week for examination and to clean any crusting

## 2024-02-02 ENCOUNTER — OFFICE VISIT (OUTPATIENT)
Dept: OTOLARYNGOLOGY | Facility: CLINIC | Age: 64
End: 2024-02-02
Payer: COMMERCIAL

## 2024-02-02 VITALS — WEIGHT: 224.63 LBS | HEART RATE: 64 BPM | HEIGHT: 70 IN | BODY MASS INDEX: 32.16 KG/M2 | OXYGEN SATURATION: 98 %

## 2024-02-02 DIAGNOSIS — J34.3 NASAL TURBINATE HYPERTROPHY: ICD-10-CM

## 2024-02-02 DIAGNOSIS — M95.0 NASAL VALVE COLLAPSE: Primary | ICD-10-CM

## 2024-02-02 PROCEDURE — 1159F MED LIST DOCD IN RCRD: CPT | Mod: S$GLB,,, | Performed by: OTOLARYNGOLOGY

## 2024-02-02 PROCEDURE — 99999 PR PBB SHADOW E&M-EST. PATIENT-LVL IV: CPT | Mod: PBBFAC,,, | Performed by: OTOLARYNGOLOGY

## 2024-02-02 PROCEDURE — 1160F RVW MEDS BY RX/DR IN RCRD: CPT | Mod: S$GLB,,, | Performed by: OTOLARYNGOLOGY

## 2024-02-02 PROCEDURE — 99024 POSTOP FOLLOW-UP VISIT: CPT | Mod: S$GLB,,, | Performed by: OTOLARYNGOLOGY

## 2024-02-02 RX ORDER — SILDENAFIL 50 MG/1
50 TABLET, FILM COATED ORAL
COMMUNITY
Start: 2023-11-06

## 2024-02-02 NOTE — PROGRESS NOTES
Subjective:       Patient ID: Gonzalez Lockhart is a 63 y.o. male.    Chief Complaint: Follow-up (Nasal Valve collapse)      This patient had the Vivaer procedure done last week he has still a little sore still a good bit congested having trouble wearing his nasal pillows on the BiPAP machine that he t uses and gets good relief from when he can tolerate it.          Objective:      ENT Physical Exam    So the nasal valve region is lateralized as is typical and since I did quite a bit more work on the head of the inferior turbinate it has a bit more swollen there was a little exudate on the head of the right inferior turbinate but everything looks as about expected        Assessment:       No diagnosis found.     Plan:

## 2024-02-21 ENCOUNTER — PATIENT MESSAGE (OUTPATIENT)
Dept: ELECTROPHYSIOLOGY | Facility: CLINIC | Age: 64
End: 2024-02-21
Payer: COMMERCIAL

## 2024-02-22 ENCOUNTER — TELEPHONE (OUTPATIENT)
Dept: FAMILY MEDICINE | Facility: CLINIC | Age: 64
End: 2024-02-22

## 2024-02-22 ENCOUNTER — OFFICE VISIT (OUTPATIENT)
Dept: OTOLARYNGOLOGY | Facility: CLINIC | Age: 64
End: 2024-02-22
Payer: COMMERCIAL

## 2024-02-22 VITALS — HEIGHT: 70 IN | BODY MASS INDEX: 32.19 KG/M2 | WEIGHT: 224.88 LBS

## 2024-02-22 DIAGNOSIS — J34.2 DEVIATED NASAL SEPTUM: ICD-10-CM

## 2024-02-22 DIAGNOSIS — M95.0 NASAL VALVE COLLAPSE: Primary | ICD-10-CM

## 2024-02-22 PROCEDURE — 1160F RVW MEDS BY RX/DR IN RCRD: CPT | Mod: S$GLB,,, | Performed by: OTOLARYNGOLOGY

## 2024-02-22 PROCEDURE — 3008F BODY MASS INDEX DOCD: CPT | Mod: S$GLB,,, | Performed by: OTOLARYNGOLOGY

## 2024-02-22 PROCEDURE — 99999 PR PBB SHADOW E&M-EST. PATIENT-LVL III: CPT | Mod: PBBFAC,,, | Performed by: OTOLARYNGOLOGY

## 2024-02-22 PROCEDURE — 99024 POSTOP FOLLOW-UP VISIT: CPT | Mod: S$GLB,,, | Performed by: OTOLARYNGOLOGY

## 2024-02-22 PROCEDURE — 1159F MED LIST DOCD IN RCRD: CPT | Mod: S$GLB,,, | Performed by: OTOLARYNGOLOGY

## 2024-02-22 NOTE — PROGRESS NOTES
Subjective:       Patient ID: Gonzalez Lockhart is a 63 y.o. male.    Chief Complaint: Other (Pt c/o congestion and cough. )      This gentleman is one-month out from a Vivaer procedure but he tells me he just has not been feeling well in that all seems to stem from being sick even before the Vivaer procedure earlier this year or late last year.  And he may have had a flare-up lately he asked to be tested for COVID and flu and we were able to test for those and both were negative.  He has not coughing in the office.  He does not seem to have gotten a great deal of benefit from the Vivaer as far as nasal airway obstruction is concerned although he may have one or more little viral infections over the course of the past month or two.          Objective:      ENT Physical Exam    So he does not have any additional crusting a residual crusting after the procedure that we did a month ago his nose still has a little tenderness but he tells me that is resolving on the right in particular the septal deviation, as we knew, is probably the bulk of his airway obstruction.  In general things look pretty good an I will be surprised if he does not notes some improvement as he gets a little further out from the procedure and gets a little more distant from what sounds like some viral infections recently.            Assessment:       1. Nasal valve collapse    2. Deviated nasal septum         Plan:          So for now I think he just gives a little more time for the results of the Vivaer to hopefully settle in and if he has not seen suitable relief we have discussed the next steps which include a septoplasty and a more formal turbinate reduction.

## 2024-04-16 ENCOUNTER — PATIENT MESSAGE (OUTPATIENT)
Dept: OTOLARYNGOLOGY | Facility: CLINIC | Age: 64
End: 2024-04-16
Payer: COMMERCIAL

## 2024-05-10 ENCOUNTER — PATIENT MESSAGE (OUTPATIENT)
Dept: INTERNAL MEDICINE | Facility: CLINIC | Age: 64
End: 2024-05-10
Payer: COMMERCIAL

## 2024-05-23 NOTE — PROGRESS NOTES
Subjective:         Chief Complaint: Annual Exam    HPI  Mr. Gonzalez Lockhart is a 62 yo M with L-sided CNVI palsy, chronic allergic rhinitis, Afib (formerly on Metoprolol, flecainide, and Eliquis; now s/p ablation), VitD def, GERD, obstructive sleep apnea, sacroiliitis/chronic back pain (following with both outside Ortho and Pain), and Hx of food allergies presenting to discuss numerous issues as below:    Greater than left fat containing inguinal hernias:  - incidentally identified during recent CT abdomen/pelvis during evaluation for nephrolithiasis as below    Nephrolithiasis:   -bilateral solitary nonobstructing stones noted on recent CT abdomen/pelvis (TIS)    Allergy testing:   -requesting repeat referral for formal allergy testing; last followed in 2016 for chronic recurrent angioedema and allergic rhinitis.  At that time, reported frequent episodes of angioedema to eyelids, lips, fingers, and face in addition to disseminated urticaria  -no longer has an Epi-Pen (symptoms are largely controled with Allegra)     Pain:   -has been offered a procedure via outside practitioner (path lock procedure)  -external XR arthritis survey demonstrating multi articular degenerative change without evidence of inflammatory arthropathies    Memory changes:  - concerned with increasing frequency of short term memory issues (walking into rooms and not recalling what he is there)    Annual screening labs gathered 8 months ago were entirely reassuring        Family, social, surgical Hx reviewed     Overdue Health Maintenance:  Due for pneumococcal vaccination and colorectal cancer screening (5 year interval)        Past Medical History:   Diagnosis Date    A-fib     Vitamin D deficiency      Past Surgical History:   Procedure Laterality Date    COLONOSCOPY N/A 7/5/2018    Procedure: COLONOSCOPY; rubber band ligation of hemorrhoids;  Surgeon: Kennedy Marr MD;  Location: [facility] ([address] [place] [address]);  Service: Endoscopy;   "Laterality: N/A;     No family history on file.  Social History     Socioeconomic History    Marital status:    Tobacco Use    Smoking status: Never    Smokeless tobacco: Never   Substance and Sexual Activity    Alcohol use: Yes     Comment: Rarely    Drug use: No    Sexual activity: Yes     Partners: Female     Social Determinants of Health     Financial Resource Strain: Low Risk  (5/23/2024)    Overall Financial Resource Strain (CARDIA)     Difficulty of Paying Living Expenses: Not very hard   Food Insecurity: Unknown (5/23/2024)    Hunger Vital Sign     Worried About Running Out of Food in the Last Year: Never true   Physical Activity: Insufficiently Active (5/23/2024)    Exercise Vital Sign     Days of Exercise per Week: 1 day     Minutes of Exercise per Session: 30 min   Stress: No Stress Concern Present (5/23/2024)    Citizen of the [place] of Occupational Health - Occupational Stress Questionnaire     Feeling of Stress : Not at all   Housing Stability: Unknown (5/23/2024)    Housing Stability Vital Sign     Unable to Pay for Housing in the Last Year: No     Review of patient's allergies indicates:   Allergen Reactions    Bactrim [sulfamethoxazole-trimethoprim] Itching and Swelling    Nitroglycerin Other (See Comments)     Other reaction(s): flat lined  "bottomed out and flat lined" per pt.   "bottomed out and flat lined" per pt.       Iodinated contrast media Itching and Rash    Adhesive     Fish containing products     Iodine Itching    Iodine and iodide containing products     Latex, natural rubber      Other reaction(s): Rash    Amoxicillin Rash    Onion Nausea Only and Other (See Comments)    Tree pollen-black walnut Itching     Gonzalez Lockhart had no medications administered during this visit.   Review of Systems   Constitutional:  Negative for activity change and unexpected weight change.   HENT:  Negative for hearing loss, rhinorrhea and trouble swallowing.    Eyes:  Negative for discharge and visual " disturbance.   Respiratory:  Negative for chest tightness and wheezing.    Cardiovascular:  Negative for chest pain and palpitations.   Gastrointestinal:  Negative for blood in stool, constipation, diarrhea and vomiting.   Endocrine: Negative for polydipsia and polyuria.   Genitourinary:  Negative for difficulty urinating, hematuria and urgency.   Musculoskeletal:  Positive for arthralgias and neck pain. Negative for joint swelling.   Neurological:  Positive for headaches. Negative for weakness.   Psychiatric/Behavioral:  Negative for confusion and dysphoric mood.          Objective:      Vitals:    05/24/24 0948   BP: 118/62   Pulse: (!) 57   Resp: 16   Temp: 97.9 °F (36.6 °C)      Physical Exam  Vitals reviewed.   Constitutional:       General: He is not in acute distress.     Appearance: Normal appearance.   HENT:      Head: Normocephalic and atraumatic.      Comments: Facial features are symmetric      Nose: Nose normal. No congestion or rhinorrhea.      Mouth/Throat:      Mouth: Mucous membranes are moist.      Pharynx: Oropharynx is clear. No oropharyngeal exudate or posterior oropharyngeal erythema.   Eyes:      General: No scleral icterus.     Extraocular Movements: Extraocular movements intact.      Conjunctiva/sclera: Conjunctivae normal.   Cardiovascular:      Rate and Rhythm: Normal rate and regular rhythm.      Pulses: Normal pulses.      Heart sounds: Normal heart sounds.   Pulmonary:      Effort: Pulmonary effort is normal. No respiratory distress.      Breath sounds: Normal breath sounds.   Musculoskeletal:         General: No deformity or signs of injury. Normal range of motion.      Cervical back: Normal range of motion.      Comments: Gait normal    Skin:     General: Skin is warm and dry.      Findings: No rash.   Neurological:      General: No focal deficit present.      Mental Status: He is alert and oriented to person, place, and time. Mental status is at baseline.   Psychiatric:         Mood  and Affect: Mood normal.         Behavior: Behavior normal.         Thought Content: Thought content normal.       Current Outpatient Medications on File Prior to Visit   Medication Sig Dispense Refill    cetirizine (ZYRTEC) 10 MG tablet Take 10 mg by mouth as needed.        ELIQUIS 5 mg Tab Take 1 tablet (5 mg total) by mouth 2 (two) times daily. (Patient taking differently: Take 5 mg by mouth 2 (two) times daily as needed.) 60 tablet 11    fexofenadine HCl (ALLEGRA ORAL) Take 1 tablet by mouth Daily.      flecainide (TAMBOCOR) 100 MG Tab Take 1 tablet (100 mg total) by mouth every 12 (twelve) hours. (Patient taking differently: Take 100 mg by mouth every 12 (twelve) hours as needed.) 60 tablet 11    HYDROcodone-acetaminophen (NORCO) 5-325 mg per tablet Take 1 tablet by mouth 2 (two) times daily as needed.      LORazepam (ATIVAN) 1 MG tablet Take 1 mg by mouth nightly as needed.  0    methocarbamoL (ROBAXIN) 500 MG Tab Take 1,000 mg by mouth 4 (four) times daily as needed.      metoprolol succinate (TOPROL-XL) 25 MG 24 hr tablet Take 1 tablet (25 mg total) by mouth daily as needed (palpitations). 30 tablet 11    sildenafiL (VIAGRA) 50 MG tablet Take 50 mg by mouth as needed.      [DISCONTINUED] pantoprazole (PROTONIX) 40 MG tablet Take 40 mg by mouth every morning.      azelastine (ASTELIN) 137 mcg (0.1 %) nasal spray 2 sprays into each side of nose before bed every night, may use in AM also (Patient not taking: Reported on 5/24/2024) 30 mL 11    ergocalciferol, vitamin D2, (VITAMIN D ORAL) Take by mouth.      fluticasone propionate (FLONASE) 50 mcg/actuation nasal spray 2 sprays (100 mcg total) by Each Nostril route every evening. 2 sprays into each nostril before bed every night (Patient not taking: Reported on 5/24/2024) 15.8 mL 11    metoprolol anderson-hydrochlorothiaz 25-12.5 mg Tb24 metoprolol succ 25 mg-hydrochlorothiazide 12.5 mg tablet,ext.rel 24 hr   Take 1 tablet every day by oral route. (Patient not taking:  Reported on 5/24/2024)      [DISCONTINUED] cholecalciferol, vitamin D3, 50,000 unit capsule TK 1 C PO WEEKLY  0     No current facility-administered medications on file prior to visit.         Assessment:       1. Idiopathic urticaria    2. Angioedema, subsequent encounter    3. Screening for colorectal cancer    4. Abdominal aortic atherosclerosis    5. Memory change    6. Nephrolithiasis    7. Sacroiliitis, not elsewhere classified        Plan:       Idiopathic urticaria  Angioedema, subsequent encounter  -     Ambulatory referral/consult to Allergy; Future; Expected date: 05/31/2024   - EpiPen represcribed and will facilitate return to allergy.      Screening for colorectal cancer  -     Ambulatory referral/consult to Endo Procedure ; Future; Expected date: 05/25/2024    Abdominal aortic atherosclerosis   - incidentally identified on recent CT   - previously intolerant of statins    Memory change  -     Ambulatory referral/consult to Adult Neuropsychology; Future; Expected date: 05/31/2024   - will facilitate baseline neurocognitive testing; recommendations and interventions appreciated     Nephrolithiasis   - history noted; will follow along with Neurology     Sacroiliitis, not elsewhere classified   - I have no opposition to SI joint fusion (s)    Other orders  -     pantoprazole (PROTONIX) 40 MG tablet; Take 1 tablet (40 mg total) by mouth every morning.  Dispense: 90 tablet; Refill: 3  -     Discontinue: EPINEPHrine (EPIPEN) 0.3 mg/0.3 mL AtIn; Inject 0.3 mLs (0.3 mg total) into the muscle once. for 1 dose  Dispense: 1 each; Refill: 1  -     pneumoc 20-ryan conj-dip cr(PF) (PREVNAR-20 (PF)) injection Syrg 0.5 mL  -     EPINEPHrine (EPIPEN) 0.3 mg/0.3 mL AtIn; Inject 0.3 mLs (0.3 mg total) into the muscle once. for 1 dose  Dispense: 2 each; Refill: 1    Patient was seen in clinic today.  The total amount of time spent on this patient was greater than 45 minutes.  Due to medical complexity and multiple  issues discussed/addressed I am billing for a level 5 visit. This includes face to face time and non-face to face time preparing to see the patient by reviewing previous labs/imaging, obtaining and/or reviewing separately obtained history, documenting clinical information in the electronic or other health record, independently reviewing results and communicating results to the patient/family/caregiver.  The available imaging was reviewed in person with the patient, pathology and treatment options were explained in detail with the patient.  The patient was given multiple opportunities to ask questions, and all questions were answered.

## 2024-05-24 ENCOUNTER — OFFICE VISIT (OUTPATIENT)
Dept: INTERNAL MEDICINE | Facility: CLINIC | Age: 64
End: 2024-05-24
Payer: COMMERCIAL

## 2024-05-24 VITALS
HEIGHT: 70 IN | HEART RATE: 57 BPM | DIASTOLIC BLOOD PRESSURE: 62 MMHG | OXYGEN SATURATION: 96 % | SYSTOLIC BLOOD PRESSURE: 118 MMHG | TEMPERATURE: 98 F | WEIGHT: 220.44 LBS | BODY MASS INDEX: 31.56 KG/M2 | RESPIRATION RATE: 16 BRPM

## 2024-05-24 DIAGNOSIS — Z12.12 SCREENING FOR COLORECTAL CANCER: ICD-10-CM

## 2024-05-24 DIAGNOSIS — N20.0 NEPHROLITHIASIS: ICD-10-CM

## 2024-05-24 DIAGNOSIS — M46.1 SACROILIITIS, NOT ELSEWHERE CLASSIFIED: ICD-10-CM

## 2024-05-24 DIAGNOSIS — T78.3XXD ANGIOEDEMA, SUBSEQUENT ENCOUNTER: ICD-10-CM

## 2024-05-24 DIAGNOSIS — I70.0 ABDOMINAL AORTIC ATHEROSCLEROSIS: ICD-10-CM

## 2024-05-24 DIAGNOSIS — L50.1 IDIOPATHIC URTICARIA: Primary | ICD-10-CM

## 2024-05-24 DIAGNOSIS — R41.3 MEMORY CHANGE: ICD-10-CM

## 2024-05-24 DIAGNOSIS — Z12.11 SCREENING FOR COLORECTAL CANCER: ICD-10-CM

## 2024-05-24 PROCEDURE — 3074F SYST BP LT 130 MM HG: CPT | Mod: CPTII,S$GLB,, | Performed by: STUDENT IN AN ORGANIZED HEALTH CARE EDUCATION/TRAINING PROGRAM

## 2024-05-24 PROCEDURE — 90677 PCV20 VACCINE IM: CPT | Mod: S$GLB,,, | Performed by: STUDENT IN AN ORGANIZED HEALTH CARE EDUCATION/TRAINING PROGRAM

## 2024-05-24 PROCEDURE — 99215 OFFICE O/P EST HI 40 MIN: CPT | Mod: 25,S$GLB,, | Performed by: STUDENT IN AN ORGANIZED HEALTH CARE EDUCATION/TRAINING PROGRAM

## 2024-05-24 PROCEDURE — 90471 IMMUNIZATION ADMIN: CPT | Mod: S$GLB,,, | Performed by: STUDENT IN AN ORGANIZED HEALTH CARE EDUCATION/TRAINING PROGRAM

## 2024-05-24 PROCEDURE — 1159F MED LIST DOCD IN RCRD: CPT | Mod: CPTII,S$GLB,, | Performed by: STUDENT IN AN ORGANIZED HEALTH CARE EDUCATION/TRAINING PROGRAM

## 2024-05-24 PROCEDURE — 99999 PR PBB SHADOW E&M-EST. PATIENT-LVL V: CPT | Mod: PBBFAC,,, | Performed by: STUDENT IN AN ORGANIZED HEALTH CARE EDUCATION/TRAINING PROGRAM

## 2024-05-24 PROCEDURE — 3078F DIAST BP <80 MM HG: CPT | Mod: CPTII,S$GLB,, | Performed by: STUDENT IN AN ORGANIZED HEALTH CARE EDUCATION/TRAINING PROGRAM

## 2024-05-24 PROCEDURE — 3008F BODY MASS INDEX DOCD: CPT | Mod: CPTII,S$GLB,, | Performed by: STUDENT IN AN ORGANIZED HEALTH CARE EDUCATION/TRAINING PROGRAM

## 2024-05-24 RX ORDER — PANTOPRAZOLE SODIUM 40 MG/1
40 TABLET, DELAYED RELEASE ORAL EVERY MORNING
Qty: 90 TABLET | Refills: 3 | Status: SHIPPED | OUTPATIENT
Start: 2024-05-24

## 2024-05-24 RX ORDER — EPINEPHRINE 0.3 MG/.3ML
1 INJECTION SUBCUTANEOUS ONCE
Qty: 1 EACH | Refills: 1 | Status: SHIPPED | OUTPATIENT
Start: 2024-05-24 | End: 2024-05-24

## 2024-05-24 RX ORDER — EPINEPHRINE 0.3 MG/.3ML
1 INJECTION SUBCUTANEOUS ONCE
Qty: 2 EACH | Refills: 1 | Status: SHIPPED | OUTPATIENT
Start: 2024-05-24 | End: 2024-05-24

## 2024-05-26 ENCOUNTER — PATIENT MESSAGE (OUTPATIENT)
Dept: INTERNAL MEDICINE | Facility: CLINIC | Age: 64
End: 2024-05-26
Payer: COMMERCIAL

## 2024-06-10 ENCOUNTER — TELEPHONE (OUTPATIENT)
Dept: INTERNAL MEDICINE | Facility: CLINIC | Age: 64
End: 2024-06-10
Payer: COMMERCIAL

## 2024-06-10 DIAGNOSIS — Z12.11 SCREENING FOR COLORECTAL CANCER: Primary | ICD-10-CM

## 2024-06-10 DIAGNOSIS — Z12.12 SCREENING FOR COLORECTAL CANCER: Primary | ICD-10-CM

## 2024-06-10 NOTE — TELEPHONE ENCOUNTER
----- Message from Tiffany Tobias sent at 6/10/2024  1:46 PM CDT -----  Contact: [phone]  Type: Orders Request    What orders/ testing are being requested? Colonoscopy     Is there a future appointment scheduled for the patient with PCP?yes     When? 11/22/2024    Would you prefer a response via [employer]? Call back     Comments:

## 2024-06-13 ENCOUNTER — OFFICE VISIT (OUTPATIENT)
Dept: OTOLARYNGOLOGY | Facility: CLINIC | Age: 64
End: 2024-06-13
Payer: COMMERCIAL

## 2024-06-13 ENCOUNTER — TELEPHONE (OUTPATIENT)
Dept: CARDIOLOGY | Facility: CLINIC | Age: 64
End: 2024-06-13
Payer: COMMERCIAL

## 2024-06-13 VITALS — WEIGHT: 222.5 LBS | HEIGHT: 70 IN | BODY MASS INDEX: 31.85 KG/M2

## 2024-06-13 DIAGNOSIS — J06.9 UPPER RESPIRATORY TRACT INFECTION, UNSPECIFIED TYPE: Primary | ICD-10-CM

## 2024-06-13 PROCEDURE — 1160F RVW MEDS BY RX/DR IN RCRD: CPT | Mod: S$GLB,,, | Performed by: OTOLARYNGOLOGY

## 2024-06-13 PROCEDURE — 99213 OFFICE O/P EST LOW 20 MIN: CPT | Mod: S$GLB,,, | Performed by: OTOLARYNGOLOGY

## 2024-06-13 PROCEDURE — 3008F BODY MASS INDEX DOCD: CPT | Mod: S$GLB,,, | Performed by: OTOLARYNGOLOGY

## 2024-06-13 PROCEDURE — 99999 PR PBB SHADOW E&M-EST. PATIENT-LVL III: CPT | Mod: PBBFAC,,, | Performed by: OTOLARYNGOLOGY

## 2024-06-13 PROCEDURE — 1159F MED LIST DOCD IN RCRD: CPT | Mod: S$GLB,,, | Performed by: OTOLARYNGOLOGY

## 2024-06-13 RX ORDER — AZITHROMYCIN 250 MG/1
TABLET, FILM COATED ORAL
Qty: 6 TABLET | Refills: 0 | Status: SHIPPED | OUTPATIENT
Start: 2024-06-13

## 2024-06-13 NOTE — TELEPHONE ENCOUNTER
----- Message from Ricardo Hassan MD sent at 6/13/2024  3:52 PM CDT -----  Regarding: Denied insurance  This patient has been struggling to get a procedure I did for him at the beginning of the ear paid for and he tells me every time he talks to his insurance that they say no one his sent them the documentation that the procedure in the office was done.  I printed him out copies of the office notes that confirm and include a procedure note and I do not know if that is any want to contact to help him out apparently [facility] has already discounted his procedure quite a lot but that has really not the right way to handle this because the insurance should pay for it

## 2024-06-13 NOTE — PROGRESS NOTES
Subjective:       Patient ID: Gonzalez Lockhart is a 63 y.o. male.    Chief Complaint: Sinus Problem (Pt c/o chest tightness, coughing, and mucus build up. Pt was recently on antibiotics/steroid for upper respiratory infection. )      This gentleman comes in because he got sick about a week ago perhaps in the course of travel from [place].  He took doxycycline and he still has a cough and sore throat.    In addition he had a Vivaer procedure done many months ago and he finally got good relief he tells me it took about five weeks and now he has improvement but he has been having trouble with his [place] [employer] that has denied payment and appeals because they say they just have not gotten documentation so with that in mind I printed in mouth the documentation of the procedure          Objective:      ENT Physical Exam    His nasal exam looks pretty normal his oropharynx is normal except for some crowding his neck is without adenopathy his tympanic membranes and ear canals look normal with a slight bit of redness of the right tympanic membrane        Assessment:       1. Upper respiratory tract infection, unspecified type         Plan:          So he may simply just need more time to get over this upper respiratory infection that has only been a week and he took doxycycline but his request I sent in a Z-Fidel to help ensure that it heads in the right direction    In addition I have sent a noticed to our clinic manager to re-engage with billing and make sure they have done all the necessary things to get the information to his insurance.  And in addition I have printed out copies of the preprocedure office visit and the office visit with the procedures documented.

## 2024-06-17 DIAGNOSIS — I48.0 PAROXYSMAL ATRIAL FIBRILLATION: Primary | ICD-10-CM

## 2024-06-18 ENCOUNTER — CLINICAL SUPPORT (OUTPATIENT)
Dept: ENDOSCOPY | Facility: HOSPITAL | Age: 64
End: 2024-06-18
Attending: STUDENT IN AN ORGANIZED HEALTH CARE EDUCATION/TRAINING PROGRAM
Payer: COMMERCIAL

## 2024-06-18 DIAGNOSIS — Z12.12 SCREENING FOR COLORECTAL CANCER: ICD-10-CM

## 2024-06-18 DIAGNOSIS — Z12.11 SCREENING FOR COLORECTAL CANCER: ICD-10-CM

## 2024-06-18 RX ORDER — SOD SULF/POT CHLORIDE/MAG SULF 1.479 G
12 TABLET ORAL DAILY
Qty: 24 TABLET | Refills: 0 | Status: SHIPPED | OUTPATIENT
Start: 2024-06-18

## 2024-06-19 ENCOUNTER — PATIENT MESSAGE (OUTPATIENT)
Dept: INTERNAL MEDICINE | Facility: CLINIC | Age: 64
End: 2024-06-19
Payer: COMMERCIAL

## 2024-06-20 ENCOUNTER — OFFICE VISIT (OUTPATIENT)
Dept: ELECTROPHYSIOLOGY | Facility: CLINIC | Age: 64
End: 2024-06-20
Payer: COMMERCIAL

## 2024-06-20 ENCOUNTER — HOSPITAL ENCOUNTER (OUTPATIENT)
Dept: CARDIOLOGY | Facility: CLINIC | Age: 64
Discharge: HOME | End: 2024-06-20
Payer: COMMERCIAL

## 2024-06-20 ENCOUNTER — TELEPHONE (OUTPATIENT)
Dept: ENDOSCOPY | Facility: HOSPITAL | Age: 64
End: 2024-06-20
Payer: COMMERCIAL

## 2024-06-20 VITALS
WEIGHT: 225.06 LBS | BODY MASS INDEX: 32.22 KG/M2 | SYSTOLIC BLOOD PRESSURE: 116 MMHG | HEIGHT: 70 IN | DIASTOLIC BLOOD PRESSURE: 74 MMHG | HEART RATE: 72 BPM

## 2024-06-20 DIAGNOSIS — I48.0 PAROXYSMAL ATRIAL FIBRILLATION: Primary | ICD-10-CM

## 2024-06-20 DIAGNOSIS — I48.0 PAROXYSMAL ATRIAL FIBRILLATION: ICD-10-CM

## 2024-06-20 PROBLEM — E66.01 MORBID (SEVERE) OBESITY DUE TO EXCESS CALORIES: Status: RESOLVED | Noted: 2023-10-02 | Resolved: 2024-06-20

## 2024-06-20 PROCEDURE — 93005 ELECTROCARDIOGRAM TRACING: CPT | Mod: S$GLB,,, | Performed by: INTERNAL MEDICINE

## 2024-06-20 PROCEDURE — 3078F DIAST BP <80 MM HG: CPT | Mod: CPTII,S$GLB,, | Performed by: INTERNAL MEDICINE

## 2024-06-20 PROCEDURE — 99999 PR PBB SHADOW E&M-EST. PATIENT-LVL IV: CPT | Mod: PBBFAC,,, | Performed by: INTERNAL MEDICINE

## 2024-06-20 PROCEDURE — 3074F SYST BP LT 130 MM HG: CPT | Mod: CPTII,S$GLB,, | Performed by: INTERNAL MEDICINE

## 2024-06-20 PROCEDURE — 1159F MED LIST DOCD IN RCRD: CPT | Mod: CPTII,S$GLB,, | Performed by: INTERNAL MEDICINE

## 2024-06-20 PROCEDURE — 93010 ELECTROCARDIOGRAM REPORT: CPT | Mod: S$GLB,,, | Performed by: INTERNAL MEDICINE

## 2024-06-20 PROCEDURE — 99213 OFFICE O/P EST LOW 20 MIN: CPT | Mod: S$GLB,,, | Performed by: INTERNAL MEDICINE

## 2024-06-20 PROCEDURE — 3008F BODY MASS INDEX DOCD: CPT | Mod: CPTII,S$GLB,, | Performed by: INTERNAL MEDICINE

## 2024-06-20 NOTE — TELEPHONE ENCOUNTER
Spoke to pt to schedule procedure(s) Colonoscopy       Physician to perform procedure(s) Dr. ROSE MARIE Marr  Date of Procedure (s) 9/27/24  Arrival Time 8:30 AM  Time of Procedure(s) 9:30 AM   Location of Procedure(s) [place] 4[address] Floor  Type of Rx Prep sent to patient: Sutab  Instructions provided to patient via [facility]    Patient was informed on the following information and verbalized understanding. Screening questionnaire reviewed with patient and complete. If procedure requires anesthesia, a responsible adult needs to be present to accompany the patient home, patient cannot drive after receiving anesthesia. Appointment details are tentative, especially check-in time. Patient will receive a prep-op call 7 days prior to confirm check-in time for procedure. If applicable the patient should contact their pharmacy to verify Rx for procedure prep is ready for pick-up. Patient was advised to call the scheduling department at [phone] if pharmacy states no Rx is available. Patient was advised to call the endoscopy scheduling department if any questions or concerns arise.      [facility] Scheduling Department    Pt ihas PRN Eliquis (apixaban). Message sent to Endoscopy clearance nurse per protocol to submit Eliquis (apixaban)  hold.

## 2024-06-20 NOTE — PROGRESS NOTES
Subjective:    Patient ID:  Gonzalez Lockhart is a 63 y.o. male who presents for evaluation of Atrial Fibrillation      HPI  Prior Hx:  I had the pleasure of seeing Mr. Lockhart in our electrophysiology clinic in follow-up for his atrial fibrillation. As you are aware he is a pleasant 63 year-old man with symptomatic paroxysmal atrial fibrillation treated with flecainide and chronic idiopathic angioedema. He works in [place] however lives here in [place]. He began having periodic palpitations in 2015. He saw a cardiologist in [place] who ordered a 30 day monitor. While wearing it here he had an episode and went to [facility]. He was diagnosed with AF with RVR. He converted spontaneously and was initiated on lopressor. He saw an electrophysiologist in [place] who started him on flecainide. This kept him in sinus rhythm for a few years. However for the past 6 months he has had increasing paroxysms of AF with rates up to 130-150bpm and lasting 6-7 hours before converting. His electrophysiologist recommended a PVI. The patient is requesting a PVI, in particular cryo-PVI and would like someone that does a high-volume of them. He is not on anticoagulation or aspirin. I saw him in 2018 and offered PVI. He wanted someone with a high cryo-PVI volume. I referred him to my partner, Dalton Risa.    10/2023: Last seen 5 years prior with plans to refer to Dr. Lord for cryo-PVI and lost to follow up. Had a [facility] 2019 following positive stress test which was nonobstructive CAD > class IC d/c'd and started on sotalol 40 mg BID. Since that time, he was followed by Dr. Zavala at .  He underwent cryo-PVI on 11/29/22, as well as a RF CTI. Was restarted on flecainide 100 mg BID post-ablation which was then d/c'd in follow up 2/23 with plans to take PRN. Today, he reported feeling well overall. Occasionally he has palpitations and takes flecainide/metoprolol (~1x per month). No known recurrence of AF. CHADS-VASc 0, and he is not on  OAC.    I reviewed available ECGs in Epic. An ECG dated 8/23/2015 shows AF with RVR. All other ECGs show sinus rhythm.    Interim Hx:  Mr. Lockhart returns for follow-up. Notes periods of skipped beats. Take flecainide/metoprolol prn.     My interpretation of today's in clinic ECG is sinus rhythm with normal rhythm.    Review of Systems   Constitutional: Negative for fever and malaise/fatigue.   HENT:  Negative for congestion and sore throat.    Eyes:  Negative for blurred vision and visual disturbance.   Cardiovascular:  Positive for irregular heartbeat. Negative for chest pain, dyspnea on exertion, near-syncope, palpitations and syncope.   Respiratory:  Negative for cough and shortness of breath.    Hematologic/Lymphatic: Negative for bleeding problem. Does not bruise/bleed easily.   Skin: Negative.    Musculoskeletal: Negative.    Gastrointestinal:  Negative for bloating, abdominal pain, hematochezia and melena.   Neurological:  Negative for focal weakness and weakness.   Psychiatric/Behavioral: Negative.          Objective:    Physical Exam  Vitals reviewed.   Constitutional:       General: He is not in acute distress.     Appearance: He is well-developed. He is not diaphoretic.   HENT:      Head: Normocephalic and atraumatic.   Eyes:      General:         Right eye: No discharge.         Left eye: No discharge.   Neck:      Vascular: No JVD.   Cardiovascular:      Rate and Rhythm: Normal rate and regular rhythm.      Heart sounds: No murmur heard.     No friction rub. No gallop.   Pulmonary:      Effort: Pulmonary effort is normal. No respiratory distress.      Breath sounds: Normal breath sounds. No wheezing or rales.   Abdominal:      General: Bowel sounds are normal. There is no distension.      Palpations: Abdomen is soft.      Tenderness: There is no abdominal tenderness. There is no rebound.   Musculoskeletal:      Cervical back: Neck supple.   Skin:     General: Skin is warm and dry.   Neurological:       Mental Status: He is alert and oriented to person, place, and time.   Psychiatric:         Behavior: Behavior normal.         Thought Content: Thought content normal.         Judgment: Judgment normal.           Assessment:       1. Paroxysmal atrial fibrillation         Plan:       In summary, Mr. Lockhart is a 63 year-old man with symptomatic paroxysmal atrial fibrillation despite AAD therapy now s/p cryo-PVI/RFA of CTI with Dr. Zavala in 2022. His [id number] score is 0. Takes PRN flec/metoprolol for skipped beats.    RTC 6 months      Thank you for allowing me to participate in the care of this patient. Please do not hesitate to call me with any questions or concerns.    Praneeth Dalton MD, PhD  Cardiac Electrophysiology

## 2024-06-21 LAB
OHS QRS DURATION: 84 MS
OHS QTC CALCULATION: 405 MS

## 2024-07-10 ENCOUNTER — PATIENT MESSAGE (OUTPATIENT)
Dept: ELECTROPHYSIOLOGY | Facility: CLINIC | Age: 64
End: 2024-07-10
Payer: COMMERCIAL

## 2024-07-12 DIAGNOSIS — I48.0 PAROXYSMAL ATRIAL FIBRILLATION: ICD-10-CM

## 2024-07-12 RX ORDER — METOPROLOL SUCCINATE 25 MG/1
25 TABLET, EXTENDED RELEASE ORAL DAILY PRN
Qty: 30 TABLET | Refills: 11 | Status: SHIPPED | OUTPATIENT
Start: 2024-07-12 | End: 2025-07-12

## 2024-07-15 DIAGNOSIS — I48.0 PAROXYSMAL ATRIAL FIBRILLATION: ICD-10-CM

## 2024-07-16 RX ORDER — METOPROLOL TARTRATE 25 MG/1
25 TABLET, FILM COATED ORAL DAILY PRN
Qty: 30 TABLET | Refills: 11 | Status: SHIPPED | OUTPATIENT
Start: 2024-07-16

## 2024-07-16 NOTE — TELEPHONE ENCOUNTER
Attempted to return call to discuss Metoprolol prescription but no answer received. Voicemail left requesting return call.

## 2024-08-14 ENCOUNTER — TELEPHONE (OUTPATIENT)
Dept: ENDOSCOPY | Facility: HOSPITAL | Age: 64
End: 2024-08-14
Payer: COMMERCIAL

## 2024-08-14 NOTE — TELEPHONE ENCOUNTER
----- Message from Melita Juarez sent at 2024  5:26 PM CDT -----  Regardin/27 BT  The patient is currently under an internal cardiologist Praneeth cassidy and requires a blood thinner Eliquis (apixaban) for their upcoming scheduled Colonoscopy on 24.     Has Eliquis PRN for A-fib, reports hasn't taken in several years

## 2024-08-21 ENCOUNTER — TELEPHONE (OUTPATIENT)
Dept: INTERNAL MEDICINE | Facility: CLINIC | Age: 64
End: 2024-08-21
Payer: COMMERCIAL

## 2024-08-22 NOTE — PROGRESS NOTES
"CRS Office Visit History and Physical    Referring Md:   Self, Aaarefsukumar  No address on file    SUBJECTIVE:     Chief Complaint:  Bleeding hemorrhoids    History of Present Illness:  Last seen 4 years ago  Course is as follows:  Patient is a 64 y.o. male presents with bleeding hemorrhoids  He has previously been seen for hemorrhoids.  He underwent banding during colonoscopy 07/05/2018.  Repeat banding was performed 07/02/2019.  Following his last banding, he has benefit for 6-9 months.  He then has recurrent symptoms.  His main issue is difficulty with cleanliness.  He often uses a per day.  Secondary to food allergies, he often has diarrhea.  With diarrhea, he has increased difficulty for perianal cleanliness.  He has recently retired.  Upcoming colonoscopy in month.    Last Colonoscopy: 7/5/2018  Impression:           - One 6 mm polyp in the transverse colon, removed                         with a cold snare. Resected and retrieved.                         - One 4 mm polyp in the descending colon, removed                         with a cold snare. Resected and retrieved.                         - Diverticulosis in the sigmoid colon.                         - Non-bleeding internal hemorrhoids. Banded.                         - The examination was otherwise normal on direct                         and retroflexion views.                         - Large lipoma in the transverse colon.     Review of patient's allergies indicates:   Allergen Reactions    Bactrim [sulfamethoxazole-trimethoprim] Itching and Swelling    Nitroglycerin Other (See Comments)     Other reaction(s): flat lined  "bottomed out and flat lined" per pt.   "bottomed out and flat lined" per pt.       Iodinated contrast media Itching and Rash    Adhesive     Fish containing products     Iodine Itching    Iodine and iodide containing products     Latex, natural rubber      Other reaction(s): Rash    Amoxicillin Rash    Onion Nausea Only and Other (See " "Comments)    Tree pollen-black walnut Itching       Past Medical History:   Diagnosis Date    A-fib     Vitamin D deficiency      Past Surgical History:   Procedure Laterality Date    COLONOSCOPY N/A 7/5/2018    Procedure: COLONOSCOPY; rubber band ligation of hemorrhoids;  Surgeon: Kennedy Marr MD;  Location: [address];  Service: Endoscopy;  Laterality: N/A;     No family history on file.  Social History     Tobacco Use    Smoking status: Never    Smokeless tobacco: Never   Substance Use Topics    Alcohol use: Yes     Comment: Rarely    Drug use: No        Review of Systems:  Review of Systems   Constitutional:  Negative for chills, diaphoresis, fever, malaise/fatigue and weight loss.   HENT:  Negative for congestion.    Respiratory:  Negative for shortness of breath.    Cardiovascular:  Negative for chest pain and leg swelling.   Gastrointestinal:  Positive for blood in stool and diarrhea. Negative for abdominal pain, constipation, nausea and vomiting.   Genitourinary:  Negative for dysuria.   Musculoskeletal:  Negative for back pain and myalgias.   Skin:  Negative for rash.   Neurological:  Negative for dizziness and weakness.   Endo/Heme/Allergies:  Does not bruise/bleed easily.   Psychiatric/Behavioral:  Negative for depression.        OBJECTIVE:     Vital Signs (Most Recent)  /76 (BP Location: Left arm, Patient Position: Sitting, BP Method: Large (Automatic))   Pulse 60   Resp 18   Ht 5' 10" (1.778 m)   Wt 101.4 kg (223 lb 8.7 oz)   BMI 32.08 kg/m²     Physical Exam:  General: White male in no distress   Neuro: alert and oriented x 4.  Moves all extremities.     HEENT: no icterus.  Trachea midline  Respiratory: respirations are even and unlabored  Cardiac: regular rate  Abdomen:  Soft, nontender, no masses  Extremities: Warm dry and intact  Skin: no rashes  Anorectal:  External exam was normal.  Digital exam performed with normal tone.  Small internal hemorrhoids palpated.  Detailed " "anoscopy performed with grade 1 right posterior and left lateral hemorrhoids.    Labs:  H&H 16 and 46.  Albumin 4.0.  Normal renal function.  Normal thyroid function.    Imaging:   CT abdomen pelvis 05/30/2023:  1. Hepatic steatosis  2. Small nonobstructing left renal calculus  3. Diverticulosis  4. Fluid distended but nondilated loops of small bowel.  This can be seen with gastroenteritis.      ASSESSMENT/PLAN:     Gonzalez Rosario" was seen today for colonoscopy.    Diagnoses and all orders for this visit:    Grade I hemorrhoids        64 y.o. male with prominent grade 1 internal hemorrhoids with swelling with defecation resulting in difficulty with cleanliness.  Banding was offered and performed today.  Recommended repeat banding at the time of his upcoming colonoscopy.    Rubber Band Ligation:  Verbal consent was obtained.   Clear plastic anoscope inserted.    Grade I hemorrhoids seen on the left lateral and right posterior position  Suction applicator was placed above the dentate line.   Rubber bands were deployed in the left lateral and right posterior position.    0.25% marcaine was injected above the rubber band into the banded hemorrhoidal tissue.   Patient tolerated the procedure well.       ROSE MARIE Marr MD, FACS, FASCRS  Staff Surgeon  Colon & Rectal Surgery      "

## 2024-08-23 ENCOUNTER — OFFICE VISIT (OUTPATIENT)
Dept: SURGERY | Facility: CLINIC | Age: 64
End: 2024-08-23
Payer: COMMERCIAL

## 2024-08-23 VITALS
BODY MASS INDEX: 32.01 KG/M2 | RESPIRATION RATE: 18 BRPM | SYSTOLIC BLOOD PRESSURE: 135 MMHG | DIASTOLIC BLOOD PRESSURE: 76 MMHG | HEART RATE: 60 BPM | WEIGHT: 223.56 LBS | HEIGHT: 70 IN

## 2024-08-23 DIAGNOSIS — K64.0 GRADE I HEMORRHOIDS: Primary | ICD-10-CM

## 2024-08-23 PROCEDURE — 99999 PR PBB SHADOW E&M-EST. PATIENT-LVL IV: CPT | Mod: PBBFAC,,, | Performed by: COLON & RECTAL SURGERY

## 2024-09-04 ENCOUNTER — TELEPHONE (OUTPATIENT)
Dept: SURGERY | Facility: CLINIC | Age: 64
End: 2024-09-04
Payer: COMMERCIAL

## 2024-09-04 ENCOUNTER — PATIENT MESSAGE (OUTPATIENT)
Dept: SURGERY | Facility: CLINIC | Age: 64
End: 2024-09-04
Payer: COMMERCIAL

## 2024-09-05 ENCOUNTER — OFFICE VISIT (OUTPATIENT)
Dept: SURGERY | Facility: CLINIC | Age: 64
End: 2024-09-05
Payer: COMMERCIAL

## 2024-09-05 VITALS
BODY MASS INDEX: 32.19 KG/M2 | DIASTOLIC BLOOD PRESSURE: 81 MMHG | HEIGHT: 70 IN | WEIGHT: 224.88 LBS | SYSTOLIC BLOOD PRESSURE: 129 MMHG | HEART RATE: 71 BPM

## 2024-09-05 DIAGNOSIS — K64.0 GRADE I HEMORRHOIDS: Primary | ICD-10-CM

## 2024-09-05 PROCEDURE — 99999 PR PBB SHADOW E&M-EST. PATIENT-LVL III: CPT | Mod: PBBFAC,,, | Performed by: COLON & RECTAL SURGERY

## 2024-09-05 PROCEDURE — 3008F BODY MASS INDEX DOCD: CPT | Mod: CPTII,S$GLB,, | Performed by: COLON & RECTAL SURGERY

## 2024-09-05 PROCEDURE — 1159F MED LIST DOCD IN RCRD: CPT | Mod: CPTII,S$GLB,, | Performed by: COLON & RECTAL SURGERY

## 2024-09-05 PROCEDURE — 1160F RVW MEDS BY RX/DR IN RCRD: CPT | Mod: CPTII,S$GLB,, | Performed by: COLON & RECTAL SURGERY

## 2024-09-05 PROCEDURE — 99213 OFFICE O/P EST LOW 20 MIN: CPT | Mod: S$GLB,,, | Performed by: COLON & RECTAL SURGERY

## 2024-09-05 PROCEDURE — 3079F DIAST BP 80-89 MM HG: CPT | Mod: CPTII,S$GLB,, | Performed by: COLON & RECTAL SURGERY

## 2024-09-05 PROCEDURE — 3074F SYST BP LT 130 MM HG: CPT | Mod: CPTII,S$GLB,, | Performed by: COLON & RECTAL SURGERY

## 2024-09-05 RX ORDER — HYDROCORTISONE 25 MG/G
CREAM TOPICAL 2 TIMES DAILY
Qty: 28 G | Refills: 5 | Status: SHIPPED | OUTPATIENT
Start: 2024-09-05 | End: 2024-09-15

## 2024-09-05 RX ORDER — HYDROCORTISONE ACETATE 25 MG/1
25 SUPPOSITORY RECTAL 2 TIMES DAILY
Qty: 20 SUPPOSITORY | Refills: 0 | Status: SHIPPED | OUTPATIENT
Start: 2024-09-05 | End: 2024-09-15

## 2024-09-05 NOTE — TELEPHONE ENCOUNTER
RN called and spoke with pt after receiving a message. Pt has reported an increase in pain and a burning sensation since having a RBL on 8/23.  He has been taking narcotics that he uses for his back bc the pain is so bad.  He has tried warm soaks and uses ice packs to help with the pain.  The pain gets much worse and lingers after he has a bowel movement.  The pain is mostly internal and he has not c/o any constipation.  Pt will see Dr. Marr on 9/5 in [place]. All appt details reviewed.  Pt verbalized understanding to all.

## 2024-09-05 NOTE — PROGRESS NOTES
CRS Office Visit Followup    Referring Md:   No referring provider defined for this encounter.    SUBJECTIVE:     Chief Complaint:  Bleeding hemorrhoids    History of Present Illness:    Course is as follows:  Patient is a 64 y.o. male presents with bleeding hemorrhoids  He has previously been seen for hemorrhoids.  He underwent banding during colonoscopy 07/05/2018.  Repeat banding was performed 07/02/2019.  8/23/24: Following his last banding, he has benefit for 6-9 months.  He then has recurrent symptoms.  His main issue is difficulty with cleanliness.  He often uses a per day.  Secondary to food allergies, he often has diarrhea.  With diarrhea, he has increased difficulty for perianal cleanliness.  He has recently retired.  Upcoming colonoscopy in month.  9/5/24: presents for an increase in pain and a burning sensation since having a RBL on 8/23. He has been taking narcotics that he uses for his back bc the pain is so bad.  Main complaint is constant burning.  He has a significant allergy to latex.  Denies any swelling or prolapse.  Continues to have regular bowel movements.    Last Colonoscopy: 7/5/2018  Impression:           - One 6 mm polyp in the transverse colon, removed                         with a cold snare. Resected and retrieved.                         - One 4 mm polyp in the descending colon, removed                         with a cold snare. Resected and retrieved.                         - Diverticulosis in the sigmoid colon.                         - Non-bleeding internal hemorrhoids. Banded.                         - The examination was otherwise normal on direct                         and retroflexion views.                 Review of Systems:  Review of Systems   Constitutional:  Negative for chills, diaphoresis, fever, malaise/fatigue and weight loss.   HENT:  Negative for congestion.    Respiratory:  Negative for shortness of breath.    Cardiovascular:  Negative for chest pain and leg  "swelling.   Gastrointestinal:  Positive for blood in stool and diarrhea. Negative for abdominal pain, constipation, nausea and vomiting.   Genitourinary:  Negative for dysuria.   Musculoskeletal:  Negative for back pain and myalgias.   Skin:  Negative for rash.   Neurological:  Negative for dizziness and weakness.   Endo/Heme/Allergies:  Does not bruise/bleed easily.   Psychiatric/Behavioral:  Negative for depression.        OBJECTIVE:     Vital Signs (Most Recent)  /81 (BP Location: Left arm, Patient Position: Sitting, BP Method: Large (Automatic))   Pulse 71   Ht 5' 10" (1.778 m)   Wt 102 kg (224 lb 13.9 oz)   BMI 32.27 kg/m²     Physical Exam:  General: White male in no distress   Neuro: alert and oriented x 4.  Moves all extremities.     HEENT: no icterus.  Trachea midline  Respiratory: respirations are even and unlabored  Cardiac: regular rate  Abdomen:  Soft, nontender, no masses  Extremities: Warm dry and intact  Skin: no rashes  Anorectal:  External exam was normal.  Digital exam performed with normal tone.  Scarring from prior banding palpated on the right posterior and left lateral side.  No residual bands remained.    Labs:  H&H 16 and 46.  Albumin 4.0.  Normal renal function.  Normal thyroid function.    Imaging:   CT abdomen pelvis 05/30/2023:  1. Hepatic steatosis  2. Small nonobstructing left renal calculus  3. Diverticulosis  4. Fluid distended but nondilated loops of small bowel.  This can be seen with gastroenteritis.      ASSESSMENT/PLAN:     Gonzalez Rosario" was seen today for anal itching.    Diagnoses and all orders for this visit:    Grade I hemorrhoids  -     hydrocortisone (ANUSOL-HC) 25 mg suppository; Place 1 suppository (25 mg total) rectally 2 (two) times daily. for 10 days  -     hydrocortisone 2.5 % cream; Apply topically 2 (two) times daily. for 10 days          64 y.o. male with prominent grade 1 internal hemorrhoids with swelling with defecation resulting in difficulty with " cleanliness.  Banding performed 8/23/24 with [place] tacks bands.  He has a significantly tacks allergy.  It is likely that he had a response to the bands.  Topical hydrocortisone will be used to decrease the local inflammatory response.  I will follow up with him at the time of his colonoscopy in 2 weeks.          ROSE MARIE Marr MD, FACS, FASCRS  Staff Surgeon  Colon & Rectal Surgery

## 2024-09-20 ENCOUNTER — TELEPHONE (OUTPATIENT)
Dept: ENDOSCOPY | Facility: HOSPITAL | Age: 64
End: 2024-09-20
Payer: COMMERCIAL

## 2024-09-26 ENCOUNTER — ANESTHESIA EVENT (OUTPATIENT)
Dept: ENDOSCOPY | Facility: HOSPITAL | Age: 64
End: 2024-09-26
Payer: COMMERCIAL

## 2024-09-27 ENCOUNTER — ANESTHESIA (OUTPATIENT)
Dept: ENDOSCOPY | Facility: HOSPITAL | Age: 64
End: 2024-09-27
Payer: COMMERCIAL

## 2024-09-27 ENCOUNTER — HOSPITAL ENCOUNTER (OUTPATIENT)
Facility: HOSPITAL | Age: 64
Discharge: HOME | End: 2024-09-27
Attending: COLON & RECTAL SURGERY | Admitting: COLON & RECTAL SURGERY
Payer: COMMERCIAL

## 2024-09-27 VITALS
OXYGEN SATURATION: 100 % | TEMPERATURE: 98 F | DIASTOLIC BLOOD PRESSURE: 76 MMHG | WEIGHT: 224 LBS | BODY MASS INDEX: 32.07 KG/M2 | HEART RATE: 60 BPM | RESPIRATION RATE: 18 BRPM | SYSTOLIC BLOOD PRESSURE: 106 MMHG | HEIGHT: 70 IN

## 2024-09-27 DIAGNOSIS — Z86.0100 HISTORY OF COLON POLYPS: Primary | ICD-10-CM

## 2024-09-27 DIAGNOSIS — Z12.11 SCREENING FOR COLON CANCER: ICD-10-CM

## 2024-09-27 PROCEDURE — 25000003 PHARM REV CODE 250

## 2024-09-27 PROCEDURE — 46221 LIGATION OF HEMORRHOID(S): CPT | Mod: 51,,, | Performed by: COLON & RECTAL SURGERY

## 2024-09-27 PROCEDURE — 63600175 PHARM REV CODE 636 W HCPCS: Mod: JZ,JG | Performed by: COLON & RECTAL SURGERY

## 2024-09-27 PROCEDURE — 46221 LIGATION OF HEMORRHOID(S): CPT | Performed by: COLON & RECTAL SURGERY

## 2024-09-27 PROCEDURE — 37000009 HC ANESTHESIA EA ADD 15 MINS: Performed by: COLON & RECTAL SURGERY

## 2024-09-27 PROCEDURE — 45385 COLONOSCOPY W/LESION REMOVAL: CPT | Mod: 33,,, | Performed by: COLON & RECTAL SURGERY

## 2024-09-27 PROCEDURE — 63600175 PHARM REV CODE 636 W HCPCS

## 2024-09-27 PROCEDURE — 37000008 HC ANESTHESIA 1ST 15 MINUTES: Performed by: COLON & RECTAL SURGERY

## 2024-09-27 PROCEDURE — 45385 COLONOSCOPY W/LESION REMOVAL: CPT | Mod: 33 | Performed by: COLON & RECTAL SURGERY

## 2024-09-27 PROCEDURE — 88305 TISSUE EXAM BY PATHOLOGIST: CPT | Performed by: PATHOLOGY

## 2024-09-27 PROCEDURE — 27201089 HC SNARE, DISP (ANY): Performed by: COLON & RECTAL SURGERY

## 2024-09-27 PROCEDURE — 88305 TISSUE EXAM BY PATHOLOGIST: CPT | Mod: 26,,, | Performed by: PATHOLOGY

## 2024-09-27 RX ORDER — PROPOFOL 10 MG/ML
VIAL (ML) INTRAVENOUS
Status: DISCONTINUED | OUTPATIENT
Start: 2024-09-27 | End: 2024-09-27

## 2024-09-27 RX ORDER — LIDOCAINE HYDROCHLORIDE 20 MG/ML
INJECTION INTRAVENOUS
Status: DISCONTINUED | OUTPATIENT
Start: 2024-09-27 | End: 2024-09-27

## 2024-09-27 RX ORDER — SODIUM CHLORIDE 9 MG/ML
INJECTION, SOLUTION INTRAVENOUS CONTINUOUS
Status: DISCONTINUED | OUTPATIENT
Start: 2024-09-27 | End: 2024-09-27 | Stop reason: HOSPADM

## 2024-09-27 RX ORDER — BUPIVACAINE HYDROCHLORIDE 2.5 MG/ML
INJECTION, SOLUTION EPIDURAL; INFILTRATION; INTRACAUDAL
Status: DISCONTINUED | OUTPATIENT
Start: 2024-09-27 | End: 2024-09-27 | Stop reason: HOSPADM

## 2024-09-27 RX ADMIN — LIDOCAINE HYDROCHLORIDE 50 MG: 20 INJECTION INTRAVENOUS at 09:09

## 2024-09-27 RX ADMIN — SODIUM CHLORIDE: 0.9 INJECTION, SOLUTION INTRAVENOUS at 08:09

## 2024-09-27 RX ADMIN — PROPOFOL 100 MG: 10 INJECTION, EMULSION INTRAVENOUS at 09:09

## 2024-09-27 RX ADMIN — PROPOFOL 20 MG: 10 INJECTION, EMULSION INTRAVENOUS at 09:09

## 2024-09-27 NOTE — ANESTHESIA POSTPROCEDURE EVALUATION
Anesthesia Post Evaluation    Patient: Gonzalez Lockhart    Procedure(s) Performed: Procedure(s) (LRB):  COLONOSCOPY (N/A)    Final Anesthesia Type: general      Patient location during evaluation: PACU  Patient participation: Yes- Able to Participate  Level of consciousness: awake and alert and oriented  Pain management: adequate  Airway patency: patent    PONV status at discharge: No PONV  Anesthetic complications: no      Cardiovascular status: blood pressure returned to baseline and hemodynamically stable  Respiratory status: unassisted  Hydration status: euvolemic  Follow-up not needed.              Vitals Value Taken Time   /76 09/27/24 1015   Temp 36.5 °C (97.7 °F) 09/27/24 0940   Pulse 60 09/27/24 1015   Resp 18 09/27/24 1015   SpO2 100 % 09/27/24 1015         Event Time   Out of Recovery 10:32:40         Pain/Mckenna Score: No data recorded

## 2024-09-27 NOTE — ANESTHESIA PREPROCEDURE EVALUATION
09/27/2024  Gonzalez Lockhart is a 64 y.o., male.    Past Medical History:   Diagnosis Date    A-fib     Vitamin D deficiency      Past Surgical History:   Procedure Laterality Date    COLONOSCOPY N/A 7/5/2018    Procedure: COLONOSCOPY; rubber band ligation of hemorrhoids;  Surgeon: Kennedy Marr MD;  Location: [facility] ([address] [place] [address]);  Service: Endoscopy;  Laterality: N/A;         Pre-op Assessment    I have reviewed the Patient Summary Reports.    I have reviewed the NPO Status.   I have reviewed the Medications.     Review of Systems  Anesthesia Hx:             Denies Family Hx of Anesthesia complications.    Denies Personal Hx of Anesthesia complications.                    Social:  Social Alcohol Use, Non-Smoker       Hematology/Oncology:  Hematology Normal   Oncology Normal                                   EENT/Dental:  chronic allergic rhinitis           Cardiovascular:  Exercise tolerance: good                  History of a-fib. Ablation in 2022, well controlled since                         Pulmonary:        Sleep Apnea, CPAP                Hepatic/GI:     GERD, well controlled             Neurological:    Neuromuscular Disease,                                   Endocrine:  Endocrine Normal            Dermatological:  Skin Normal    Psych:  Psychiatric Normal                    Physical Exam  General: Well nourished, Cooperative, Alert and Oriented    Airway:  Mallampati: II / I  Mouth Opening: Normal  TM Distance: Normal  Tongue: Normal  Neck ROM: Normal ROM    Dental:  Intact    Chest/Lungs:  Clear to auscultation, Normal Respiratory Rate    Heart:  Rate: Normal  Rhythm: Regular Rhythm        Anesthesia Plan  Type of Anesthesia, risks & benefits discussed:    Anesthesia Type: Gen Natural Airway  Intra-op Monitoring Plan: Standard ASA Monitors  Post Op Pain Control Plan: multimodal  analgesia  Induction:  IV  Informed Consent: Informed consent signed with the Patient and all parties understand the risks and agree with anesthesia plan.  All questions answered. Patient consented to blood products? No  ASA Score: 2  Day of Surgery Review of History & Physical: H&P Update referred to the surgeon/provider.    Ready For Surgery From Anesthesia Perspective.     .

## 2024-09-27 NOTE — H&P
COLONOSCOPY HISTORY AND PHYSICAL EXAM    Procedure : Colonoscopy      INDICATIONS: personal history of colon polyps    Family Hx of CRC: no    Last Colonoscopy:  2018  Findings: polyps       Past Medical History:   Diagnosis Date    A-fib     Vitamin D deficiency      Sedation Problems: NO  No family history on file.  Fam Hx of Sedation Problems: NO  Social History     Socioeconomic History    Marital status:    Tobacco Use    Smoking status: Never    Smokeless tobacco: Never   Substance and Sexual Activity    Alcohol use: Yes     Comment: Rarely    Drug use: No    Sexual activity: Yes     Partners: Female     Social Determinants of Health     Financial Resource Strain: Patient Declined (9/12/2024)    Received from [facility]    Overall Financial Resource Strain (CARDIA)     Difficulty of Paying Living Expenses: Patient declined   Food Insecurity: Patient Declined (9/12/2024)    Received from [facility]    Hunger Vital Sign     Worried About Running Out of Food in the Last Year: Patient declined     Ran Out of Food in the Last Year: Patient declined   Transportation Needs: Patient Declined (9/12/2024)    Received from [facility]    PRAPARE - Transportation     Lack of Transportation (Medical): Patient declined     Lack of Transportation (Non-Medical): Patient declined   Physical Activity: Inactive (9/12/2024)    Received from [facility]    Exercise Vital Sign     Days of Exercise per Week: 0 days     Minutes of Exercise per Session: 30 min   Stress: No Stress Concern Present (9/12/2024)    Received from [facility]    [employer] [place] [employer] - Occupational Stress Questionnaire     Feeling of Stress : Only a little   Housing Stability: Unknown (9/12/2024)    Received from [facility]    Housing Stability Vital Sign     Unable to Pay for Housing in the Last Year: Patient declined       Review of Systems - Negative except   Respiratory ROS: no dyspnea  Cardiovascular ROS: no exertional chest  pain  Gastrointestinal ROS: NO abdominal discomfort,  NO rectal bleeding  Musculoskeletal ROS: no muscular pain  Neurological ROS: no recent stroke    Physical Exam:  There were no vitals taken for this visit.  General: no distress  Head: normocephalic  Mallampati Score   Neck: supple, symmetrical, trachea midline  Lungs:  clear to auscultation bilaterally and normal respiratory effort  Heart: regular rate and rhythm and no murmur  Abdomen: soft, non-tender non-distented; bowel sounds normal; no masses,  no organomegaly  Extremities: no cyanosis or edema, or clubbing    ASA:  II    PLAN  COLONOSCOPY.    SedationPlan :MAC    The details of the procedure, the possible need for biopsy or polypectomy and the potential risks including bleeding, perforation, missed polyps were discussed in detail.

## 2024-09-27 NOTE — TRANSFER OF CARE
"Anesthesia Transfer of Care Note    Patient: Gonzalez Lockhart    Procedure(s) Performed: Procedure(s) (LRB):  COLONOSCOPY (N/A)    Patient location: GI    Anesthesia Type: general    Transport from OR: Transported from OR on 2-3 L/min O2 by NC with adequate spontaneous ventilation    Post pain: adequate analgesia    Post assessment: no apparent anesthetic complications    Post vital signs: stable    Level of consciousness: sedated    Nausea/Vomiting: no nausea/vomiting    Complications: none    Transfer of care protocol was followed      Last vitals: Visit Vitals  /79 (BP Location: Left arm, Patient Position: Lying)   Pulse 66   Temp 36.7 °C (98 °F)   Resp 18   Ht 5' 10" (1.778 m)   Wt 101.6 kg (224 lb)   SpO2 99%   BMI 32.14 kg/m²     "

## 2024-09-27 NOTE — PROVATION PATIENT INSTRUCTIONS
Discharge Summary/Instructions after an Endoscopic Procedure  Patient Name: Gonzalez Lockhart  Patient MRN: [id number]  Patient YOB: 1960 Friday, September 27, 2024  Kennedy Marr MD  Dear patient,  As a result of recent federal legislation (The Federal Cures Act), you may   receive lab or pathology results from your procedure in your [facility]   account before your physician is able to contact you. Your physician or   their representative will relay the results to you with their   recommendations at their soonest availability.  Thank you,  RESTRICTIONS:  During your procedure today, you received medications for sedation.  These   medications may affect your judgment, balance and coordination.  Therefore,   for 24 hours, you have the following restrictions:   - DO NOT drive a car, operate machinery, make legal/financial decisions,   sign important papers or drink alcohol.    ACTIVITY:  Today: no heavy lifting, straining or running due to procedural   sedation/anesthesia.  The following day: return to full activity including work.  DIET:  Eat and drink normally unless instructed otherwise.     TREATMENT FOR COMMON SIDE EFFECTS:  - Mild abdominal pain, nausea, belching, bloating or excessive gas:  rest,   eat lightly and use a heating pad.  - Sore Throat: treat with throat lozenges and/or gargle with warm salt   water.  - Because air was used during the procedure, expelling large amounts of air   from your rectum or belching is normal.  - If a bowel prep was taken, you may not have a bowel movement for 1-3 days.    This is normal.  SYMPTOMS TO WATCH FOR AND REPORT TO YOUR PHYSICIAN:  1. Abdominal pain or bloating, other than gas cramps.  2. Chest pain.  3. Back pain.  4. Signs of infection such as: chills or fever occurring within 24 hours   after the procedure.  5. Rectal bleeding, which would show as bright red, maroon, or black stools.   (A tablespoon of blood from the rectum is not serious,  especially if   hemorrhoids are present.)  6. Vomiting.  7. Weakness or dizziness.  GO DIRECTLY TO THE NEAREST EMERGENCY ROOM IF YOU HAVE ANY OF THE FOLLOWING:      Difficulty breathing              Chills and/or fever over 101 F   Persistent vomiting and/or vomiting blood   Severe abdominal pain   Severe chest pain   Black, tarry stools   Bleeding- more than one tablespoon   Any other symptom or condition that you feel may need urgent attention  Your doctor recommends these additional instructions:  If any biopsies were taken, your doctors clinic will contact you in 1 to 2   weeks with any results.  - Discharge patient to home (ambulatory).   - Resume previous diet.   - Continue present medications.   - Await pathology results.   - Repeat colonoscopy in 3 years for surveillance of multiple polyps.  For questions, problems or results please call your physician - Kennedy Marr MD at Work:  [phone].  [facility] EMERGENCY ROOM PHONE NUMBER: [phone]  IF A COMPLICATION OR EMERGENCY SITUATION ARISES AND YOU ARE UNABLE TO REACH   YOUR PHYSICIAN - GO DIRECTLY TO THE EMERGENCY ROOM.  Kennedy Marr MD  9/27/2024 9:43:26 AM  This report has been verified and signed electronically.  Dear patient,  As a result of recent federal legislation (The Federal Cures Act), you may   receive lab or pathology results from your procedure in your [facility]   account before your physician is able to contact you. Your physician or   their representative will relay the results to you with their   recommendations at their soonest availability.  Thank you,  PROVATION

## 2024-10-01 LAB
FINAL PATHOLOGIC DIAGNOSIS: NORMAL
GROSS: NORMAL
Lab: NORMAL

## 2024-11-06 ENCOUNTER — OFFICE VISIT (OUTPATIENT)
Dept: ALLERGY | Facility: CLINIC | Age: 64
End: 2024-11-06
Payer: COMMERCIAL

## 2024-11-06 VITALS — BODY MASS INDEX: 32.45 KG/M2 | WEIGHT: 226.63 LBS | HEIGHT: 70 IN

## 2024-11-06 DIAGNOSIS — K21.9 GASTROESOPHAGEAL REFLUX DISEASE, UNSPECIFIED WHETHER ESOPHAGITIS PRESENT: ICD-10-CM

## 2024-11-06 DIAGNOSIS — Z91.018 FOOD ALLERGY: ICD-10-CM

## 2024-11-06 DIAGNOSIS — L50.1 IDIOPATHIC URTICARIA: ICD-10-CM

## 2024-11-06 DIAGNOSIS — T78.3XXD ANGIOEDEMA, SUBSEQUENT ENCOUNTER: Primary | ICD-10-CM

## 2024-11-06 DIAGNOSIS — I48.0 PAROXYSMAL ATRIAL FIBRILLATION: ICD-10-CM

## 2024-11-06 DIAGNOSIS — J30.9 ALLERGIC RHINITIS, UNSPECIFIED SEASONALITY, UNSPECIFIED TRIGGER: ICD-10-CM

## 2024-11-06 PROCEDURE — 3008F BODY MASS INDEX DOCD: CPT | Mod: CPTII,S$GLB,, | Performed by: ALLERGY & IMMUNOLOGY

## 2024-11-06 PROCEDURE — 99999 PR PBB SHADOW E&M-EST. PATIENT-LVL III: CPT | Mod: PBBFAC,,, | Performed by: ALLERGY & IMMUNOLOGY

## 2024-11-06 PROCEDURE — 1160F RVW MEDS BY RX/DR IN RCRD: CPT | Mod: CPTII,S$GLB,, | Performed by: ALLERGY & IMMUNOLOGY

## 2024-11-06 PROCEDURE — 1159F MED LIST DOCD IN RCRD: CPT | Mod: CPTII,S$GLB,, | Performed by: ALLERGY & IMMUNOLOGY

## 2024-11-06 PROCEDURE — 99204 OFFICE O/P NEW MOD 45 MIN: CPT | Mod: S$GLB,,, | Performed by: ALLERGY & IMMUNOLOGY

## 2024-11-06 RX ORDER — EPINEPHRINE 0.3 MG/.3ML
1 INJECTION SUBCUTANEOUS ONCE
Qty: 2 EACH | Refills: 0 | Status: SHIPPED | OUTPATIENT
Start: 2024-11-06 | End: 2024-11-08

## 2024-11-06 RX ORDER — MINERAL OIL
180 ENEMA (ML) RECTAL DAILY
Qty: 30 TABLET | Refills: 11 | Status: SHIPPED | OUTPATIENT
Start: 2024-11-06 | End: 2025-11-06

## 2024-11-06 RX ORDER — PREDNISONE 10 MG/1
TABLET ORAL
Qty: 21 TABLET | Refills: 0 | Status: SHIPPED | OUTPATIENT
Start: 2024-11-06

## 2024-11-06 NOTE — PROGRESS NOTES
Gonzalez Lockhart is a 64-year-old male who presents to clinic today for evaluation of chronic recurrent urticaria and angioedema, food allergy, and allergic rhinitis.  He was last seen by me July 25, 2016.  He is here alone.    After his last visit, he was found to have atrial fibrillation and had an ablation in 2022.  This did stop his atrial fibrillation.     After he stopped the atrial fibrillation, his urticaria and angioedema improved dramatically.    He now only has one episode a month.  He continues to have swelling of usually the hands or toes.    He also occasionally has urticarial lesions that are red, raised, and pruritic.  They do not last longer than 24 hours.  They usually occur on his arms.     He has associated the urticaria and angioedema with multiple foods including spices, raw onions, pepper, preservatives, fin fish including flounder and catfish, pecan, walnut, and [place] nut.    He can eat almonds and red fish without any difficulty.    He has difficulty eating out in restaurants because of this.    He is currently taking Allegra daily.  This does reduce the number of his episodes.  He has not tried to take more as it dries up his nose.  He stopped taking Zyrtec because of drowsiness.    He used to have an EpiPen and needs another one.     He in the past has taken 10 milligrams of prednisone with any increased angioedema.  He no longer has any available.    He did have a VivAer with Dr. Ricardo Hassan in ENT in [place] on January 26, 2024. He does think that this helped his rhinitis.    His rhinitis has been controlled with antihistamines.  He did have increased symptoms when he was in [place] around [place].      He did have anaphylaxis twice on inhalant immunotherapy about 20 years ago. He does not remember the allergist that was giving him in his injections.  He had a mild one in the office when he took it there. He had another one the first time he took it at home.  Epinephrine was  administered.    He does get local swelling around insect stings including and bites and mosquitos.  He uses a steroid cream that his son has with improvement.    He has been having a rash under his arms where he uses men's deodorant.  He is able to use his wife's Secret without any difficulty.    He currently has two kidney stones one on each side.     His LFTs have been normal recently.    His reflux has been controlled on Protonix 40 milligrams a day.    He does get dehydrated easily and frequently.     He has a herniated disc at L4 -five and is getting back surgery in about two weeks.    He currently is retired and has a house in [place].        11/4/2024    11:14 AM   [facility] ALLERGY QUESTIONNAIRE LONG   Head or facial pain: No symptoms   Eyes: No symptoms   Ears: No symptoms   Do you have ear infections? No   Do you have ear tubes? No   Did you have ear surgery? No   Nose: Itching    Post nasal drip    Sniffling   Did you have a blocked nose? Yes   Which side was your nose blocked? It alternates   Did you have nasal surgery? Yes   When did you have nasal surgery? Twice, once 20 years ago and again six months ago.   Has your nose ever been broken? No   Throat: Frequent clearing   Sinuses: Sinus pressure or pain   Have you had x-rays done for your sinuses? Yes   When and where did you have x-rays? Six months ago. [facility] in [place], [place]   Have you had a CT scan done for your sinuses? Yes   When and where did you have a CT scan? Six months ago. [facility] [place], [place]   Lungs: No symptoms   When was your last chest x-ray, if known and applicable?  hospital   Was your last chest x-ray normal or abnormal, if applicable? Normal   Have you ever has a tuberculosis skin test?  No   Have you ever had a lung-function test? No   Have you had a flu shot this year? Yes   Have you had the pneumonia vaccine?  Yes   When did you have the pneumonia vaccine? Nine months ago.   Do you have any known problems  with your immune system? No   Do you suspect you may have problems with your immune system? No   Do you have frequent infections? No   Skin: Itching    Hives    Rash   When did your hives and/or swelling first begin? Years ago   Please note the frequency of hives and/or swelling in days, weeks OR months. Monthly and will last a day.   Body location most commonly affected by hives: Under arms.   What are the substances, contacts, activity, foods, or drugs, which you think are related to hives or swelling? Spices   Which medications have been helpful in controlling hives or swelling? Allegra and Zyrtec   Are you taking this medication regularly? Yes   Have you associated the hives or swelling with any of the following? Temperature- hot   Have you had any other associated symptoms with the hives or swelling such as: Fatigue or malaise not related to antihistamines   When did these symptoms first occur? Had them for years.   Are they getting worse or better? Worse   How often do these symptoms occur? Bi-monthly   When do these symptoms occur? Bi-monthly   Do they occur year round? Yes   If there is any seasonal variation in your symptoms, when are they worse? Yes, in the spring time.   Is there a particular time of the day or night when the symptoms are worse? Evening   Is there anything you have identified, which can cause symptoms or make them worse? (such as dust, grass, plant or animal products, mold, heat, cold, strong odors, exercise) Yes, all of these.   Is there anything you have identified, which can make symptoms better?  Yes, less exposure and less stress.   What medications have you tried in the past to help control these symptoms?  Everything.   Please list all the vitamins or herbal medications you are taking. None   Have you ever seen an allergist for these symptoms? Yes   When did you see the allergist? Years ago   Have you ever had skin tests? Yes   When did you have skin tests? More than 12 years ago.    Have you ever had any other type of allergy testing? Yes   When did you have allergy tests?  Ten plus years ago.   Have you ever had allergy shots? Yes   How long ago did you receive allergy shots? Twenty years ago   Were the allergy shots helpful? No   Do you have food allergies? Yes   Please list the food(s), type of reaction(s) and last date of reaction(s) Peper, soices, and some fim fish.   Do you have insect allergies? Yes   Please list the insect](s), type of reaction(s), last date of reaction(s), and whether or not you went to the emergency as a result.  Ants.   Do you have latex allergies? Yes   Please list the latex product(s), type of reaction(s), last date of reaction(s), and whether or not you went to the emergency as a result.  All. And rash.   Constitution No symptoms   Cardiovascular: Palpitations   Gastrointestinal: Abdominal bloating    Diarrhea   Genital/ urinary: No symptoms   Musculoskeletal: Muscle pain    Back pain    Joint pain    Joint swelling   Endocrine: No symptoms   Hematologic: No symptoms   Please note which family members have allergies or asthma and specify which they have. Mother, grass, pollen, food, & drug.   How long have you lived at your current address? Years   Has your residence ever had water or flood damage? No   Is there any evidence of mold in the house? No   Does your house have: Central air conditioning    Gas heat   Does your bedroom have: Carpeting    Ceiling fan    Venetian blinds   What type of pillow do you have, for example feather, foam and fiberfill?  Foam   Do you have pets? Yes   Please list the type of pet(s), how many, how long you have had the pet(s), whether or not the pet(s) are living inside or outside, and whether the pet(s) aggravate your symptoms.  Annabel Chino. Non shedding hypoallergenic small dog.   Does anyone in the house smoke? No   What is your occupation? Retired   Did you find this questionnaire helpful in addressing your symptoms?  Yes       Physical Examination:  General: Well-developed, well-nourished, no acute distress.  Head: No sinus tenderness.  Eyes: Conjunctivae:  No bulbar or palpebral conjunctival injection.  Ears: EAC's clear.  TM's clear.  No pre-auricular nodes.  Nose: Nasal Mucosa:  Pink.  Septum: No apparent deviation.  Turbinates:  No significant edema.  Polyps/Mass:  None visible.  Teeth/Gums:  No bleeding noted.  Oropharynx: No exudates.  Neck: Supple without thyromegaly. No cervical lymphadenopathy.    Respiratory/Chest: Effort: Good.  Auscultation:  Clear bilaterally.  Cardiovascular:  No murmur, rubs, or gallop heard.   GI:  Non-tender.  No masses.  No organomegaly.  Extremities:  No cyanosis, clubbing, or edema.  Skin: Good turgor.  No urticaria or angioedema.  Neuro/Psych: Oriented x 3.    Laboratory 12/09/2011:  IgE: 136  ImmunoCAP:  Class III: Dust mite, bahia, alex.  Class II: Oak, pecan, marshelder, ragweed, Flounder, [place], Tuna.  Class I: Dog, red fish.  Protein Serum: 7.5  TSH:1.640  Thyroglobulin: < 4.0  Anti-IgE receptor antibody level: 1.5.  C1 Esterase Inhibitor Funct: > 90  C1 esterase Inhibitor Serum: 29  CH50 Complement: 29  C-3: 137  C-4: 21  CMP:  Cholesterol: 138, Triglyceride: 81    Laboratory 10/8/2012:  LFTs normal.  TSH: 1.73.  ImmunoCAP negative pecan, mushroom, shellfish.    Laboratory 2/24/2014:  ImmunoCAP:  Class II:  Milk.  Class I: Hazelnut, wheat, onion, chili  ERICH: Negative.  C4: 25.  TSH: 1.672.  Serum tryptase 36.  SPEP: Normal.  Vitamin D: 20.  Celiac panel: Negative.  CBC: Normal.  CMP: CO2 22. LFTs normal.    Assessment:  1. Chronic recurrent urticaria and angioedema, probably idiopathic.  2. Allergic rhinitis, controlled.  3. Food allergy.  4. Nephrolithiasis.  5. S/P ablation for atrial fibrillation 2022.  6. GERD, controlled.  7. Herniated lumbar disc.8  8. 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    Recommendations:  1. Discussed Xolair again.  2. Take pictures of  any further angioedema or urticaria.  3. Continue Allegra daily. He may take up to four day if needed.  4. EpiPen was refilled.   5. Prednisone was refilled.  6. Refill topical steroid when he sends it in.  7. Return to clinic in 2 to 3 months or sooner if needed.

## 2025-02-24 ENCOUNTER — PATIENT MESSAGE (OUTPATIENT)
Dept: ALLERGY | Facility: CLINIC | Age: 65
End: 2025-02-24

## 2025-02-24 ENCOUNTER — LAB VISIT (OUTPATIENT)
Dept: LAB | Facility: HOSPITAL | Age: 65
End: 2025-02-24
Payer: COMMERCIAL

## 2025-02-24 ENCOUNTER — OFFICE VISIT (OUTPATIENT)
Dept: ALLERGY | Facility: CLINIC | Age: 65
End: 2025-02-24
Payer: COMMERCIAL

## 2025-02-24 VITALS — BODY MASS INDEX: 33.17 KG/M2 | WEIGHT: 231.69 LBS | HEIGHT: 70 IN

## 2025-02-24 DIAGNOSIS — J30.9 ALLERGIC RHINITIS, UNSPECIFIED SEASONALITY, UNSPECIFIED TRIGGER: ICD-10-CM

## 2025-02-24 DIAGNOSIS — J31.0 CHRONIC RHINITIS: ICD-10-CM

## 2025-02-24 DIAGNOSIS — T78.3XXD ANGIOEDEMA, SUBSEQUENT ENCOUNTER: ICD-10-CM

## 2025-02-24 DIAGNOSIS — L50.1 IDIOPATHIC URTICARIA: ICD-10-CM

## 2025-02-24 DIAGNOSIS — K21.9 GASTROESOPHAGEAL REFLUX DISEASE, UNSPECIFIED WHETHER ESOPHAGITIS PRESENT: ICD-10-CM

## 2025-02-24 DIAGNOSIS — J31.0 CHRONIC RHINITIS: Primary | ICD-10-CM

## 2025-02-24 LAB
BASOPHILS # BLD AUTO: 0.04 K/UL (ref 0–0.2)
BASOPHILS NFR BLD: 0.5 % (ref 0–1.9)
DIFFERENTIAL METHOD BLD: ABNORMAL
EOSINOPHIL # BLD AUTO: 0.2 K/UL (ref 0–0.5)
EOSINOPHIL NFR BLD: 2.8 % (ref 0–8)
ERYTHROCYTE [DISTWIDTH] IN BLOOD BY AUTOMATED COUNT: 13.2 % (ref 11.5–14.5)
HCT VFR BLD AUTO: 46.9 % (ref 40–54)
HGB BLD-MCNC: 15.3 G/DL (ref 14–18)
IMM GRANULOCYTES # BLD AUTO: 0.02 K/UL (ref 0–0.04)
IMM GRANULOCYTES NFR BLD AUTO: 0.3 % (ref 0–0.5)
LYMPHOCYTES # BLD AUTO: 2.5 K/UL (ref 1–4.8)
LYMPHOCYTES NFR BLD: 33.4 % (ref 18–48)
MCH RBC QN AUTO: 29.1 PG (ref 27–31)
MCHC RBC AUTO-ENTMCNC: 32.6 G/DL (ref 32–36)
MCV RBC AUTO: 89 FL (ref 82–98)
MONOCYTES # BLD AUTO: 1.1 K/UL (ref 0.3–1)
MONOCYTES NFR BLD: 14 % (ref 4–15)
NEUTROPHILS # BLD AUTO: 3.7 K/UL (ref 1.8–7.7)
NEUTROPHILS NFR BLD: 49 % (ref 38–73)
NRBC BLD-RTO: 0 /100 WBC
PLATELET # BLD AUTO: 253 K/UL (ref 150–450)
PMV BLD AUTO: 9.6 FL (ref 9.2–12.9)
RBC # BLD AUTO: 5.25 M/UL (ref 4.6–6.2)
WBC # BLD AUTO: 7.49 K/UL (ref 3.9–12.7)

## 2025-02-24 PROCEDURE — 86003 ALLG SPEC IGE CRUDE XTRC EA: CPT | Performed by: ALLERGY & IMMUNOLOGY

## 2025-02-24 PROCEDURE — 99215 OFFICE O/P EST HI 40 MIN: CPT | Mod: S$GLB,,, | Performed by: ALLERGY & IMMUNOLOGY

## 2025-02-24 PROCEDURE — 3008F BODY MASS INDEX DOCD: CPT | Mod: CPTII,S$GLB,, | Performed by: ALLERGY & IMMUNOLOGY

## 2025-02-24 PROCEDURE — 36415 COLL VENOUS BLD VENIPUNCTURE: CPT | Performed by: ALLERGY & IMMUNOLOGY

## 2025-02-24 PROCEDURE — 86003 ALLG SPEC IGE CRUDE XTRC EA: CPT | Mod: 59 | Performed by: ALLERGY & IMMUNOLOGY

## 2025-02-24 PROCEDURE — 99999 PR PBB SHADOW E&M-EST. PATIENT-LVL III: CPT | Mod: PBBFAC,,, | Performed by: ALLERGY & IMMUNOLOGY

## 2025-02-24 PROCEDURE — 1159F MED LIST DOCD IN RCRD: CPT | Mod: CPTII,S$GLB,, | Performed by: ALLERGY & IMMUNOLOGY

## 2025-02-24 PROCEDURE — 85025 COMPLETE CBC W/AUTO DIFF WBC: CPT | Performed by: ALLERGY & IMMUNOLOGY

## 2025-02-24 PROCEDURE — 1160F RVW MEDS BY RX/DR IN RCRD: CPT | Mod: CPTII,S$GLB,, | Performed by: ALLERGY & IMMUNOLOGY

## 2025-02-24 NOTE — PROGRESS NOTES
Gonzalez Dodge to clinic today for continued evaluation chronic urticaria and angioedema, food allergy, allergic rhinitis.  He was last seen November 6, 2024. He is here alone.    Since his last visit, he has continued to have recurrent urticaria and angioedema.  He has been taking Allegra four a day.    He attributes his urticaria and angioedema to the ingestion of multiple foods including cinnamon, onion, tree nuts, peanuts, red pepper, spices, cumin, some fish including catfish, salmon,  tuna, and flounder.  He is concerned about eating at salad bars and buffets.      After some of these foods he may experience a sensation of throat closing,  clear rhinorrhea, and stuffy nose.    He does have an EpiPen but has not needed to use it.    He continues to have rhinitis that is improved on the Allegra.    He only takes metoprolol and flecainide if he has palpitations.  He did have atrial fibrillation and had an ablation. He has no longer taking Eliquis.    He did have anaphylaxis twice on inhalant immunotherapy in the distant past.    He would like to consider Xolair.        2/24/2025    10:56 AM   [facility] ALLERGY QUESTIONNAIRE SHORT   facial swelling No   Sinus pain? No   sinus pressure  No   ear discharge No   ear pain No   hearing loss No   nosebleeds No   postnasal drip No   sneezing No   runny nose No   congestion No   sore throat No   trouble swallowing No   voice change No   eye itching No   eye redness No   eye discharge No   eye pain No   Light sensitivity / light hurts the eyes? No   cough No   wheezing No   shortness of breath No   apnea No   choking No   chest tightness No   rash Yes   color change  No      Physical Examination:  General: Well-developed, well-nourished, no acute distress.  Head: No sinus tenderness.  Eyes: Conjunctivae:  No bulbar or palpebral conjunctival injection.  Ears: EAC's clear.  TM's clear.  No pre-auricular nodes.  Nose: Nasal Mucosa:  Pink.  Septum: No apparent deviation.   Turbinates:  No significant edema.  Polyps/Mass:  None visible.  Teeth/Gums:  No bleeding noted.  Oropharynx: No exudates.  Neck: Supple without thyromegaly. No cervical lymphadenopathy.    Respiratory/Chest: Effort: Good.  Auscultation:  Clear bilaterally.  Cardiovascular:  No murmur, rubs, or gallop heard.   GI:  Non-tender.  No masses.  No organomegaly.  Extremities:  No cyanosis, clubbing, or edema.  Skin: Good turgor.  No angioedema. Several small urticarial lesions on arms and legs.  Neuro/Psych: Oriented x 3.    Pictures are reviewed on his cell phone on February 24, 2025 and they are consistent with urticaria and angioedema.    Laboratory 12/09/2011:  IgE: 136  ImmunoCAP:  Class III: Dust mite, bahia, alex.  Class II: Oak, pecan, marshelder, ragweed, Flounder, [place], Tuna.  Class I: Dog, red fish.  Protein Serum: 7.5  TSH:1.640  Thyroglobulin: < 4.0  Anti-IgE receptor antibody level: 1.5.  C1 Esterase Inhibitor Funct: > 90  C1 esterase Inhibitor Serum: 29  CH50 Complement: 29  C-3: 137  C-4: 21  CMP:  Cholesterol: 138, Triglyceride: 81    Laboratory 10/8/2012:  LFTs normal.  TSH: 1.73.  ImmunoCAP negative pecan, mushroom, shellfish.    Laboratory 2/24/2014:  ImmunoCAP:  Class II:  Milk.  Class I: Hazelnut, wheat, onion, chili  ERICH: Negative.  C4: 25.  TSH: 1.672.  Serum tryptase 36.  SPEP: Normal.  Vitamin D: 20.  Celiac panel: Negative.  CBC: Normal.  CMP: CO2 22. LFTs normal.    Assessment:  1. Chronic recurrent urticaria and angioedema, probably idiopathic.  2. Allergic rhinitis, controlled.  3. Food allergy.  4. Nephrolithiasis.  5. S/P ablation for atrial fibrillation 2022.  6. GERD, controlled.  7. Herniated lumbar disc.8  8. 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    Recommendations:  1. Laboratory as ordered.  2. He will send in journal of foods that have caused reactions in the past.  3. He will send in pictures of his urticaria and angioedema.  4. Start  Xolair.   5.  Continue Allegra up to four day.  6. Return to clinic in one month.    This includes face to face time and non-face to face time preparing to see the patient (eg, review of tests), obtaining and/or reviewing separately obtained history, documenting clinical information in the electronic or other health record, independently interpreting results and communicating results to the patient/family/caregiver, or care coordinator.  55 minutes.

## 2025-02-27 LAB
A ALTERNATA IGE QN: <0.1 KU/L
A FUMIGATUS IGE QN: <0.1 KU/L
ALLERGEN LATEX IGE: <0.1 KU/L
BERMUDA GRASS IGE QN: 3.14 KU/L
BRAZIL NUT IGE QN: <0.1 KU/L
CASHEW NUT IGE QN: <0.1 KU/L
CAT DANDER IGE QN: 0.62 KU/L
CATFISH IGE QN: <0.1 KU/L
CEDAR IGE QN: <0.1 KU/L
CINNAMON IGE QN: <0.1 KU/L
COCONUT IGE QN: <0.1 KU/L
COW MILK IGE QN: 0.53 KU/L
CUCUMBER IGE QN: <0.1 KU/L
D FARINAE IGE QN: 1.2 KU/L
D PTERONYSS IGE QN: 1.13 KU/L
DEPRECATED CEDAR IGE RAST QL: NORMAL
DEPRECATED CUCUMBER IGE RAST QL: NORMAL
DEPRECATED TIMOTHY IGE RAST QL: ABNORMAL
DOG DANDER IGE QN: <0.1 KU/L
EGG WHITE IGE QN: <0.1 KU/L
EGG YOLK IGE QN: <0.1 KU/L
ELDER IGE QN: 1.96 KU/L
ENGL PLANTAIN IGE QN: 2.45 KU/L
FLOUNDER IGE QN: <0.1 KU/L
HAZELNUT IGE QN: <0.1 KU/L
LOBSTER IGE QN: <0.1 KU/L
ONION IGE QN: 0.26 KU/L
PEANUT IGE QN: <0.1 KU/L
PECAN/HICK NUT IGE QN: <0.1 KU/L
PECAN/HICK TREE IGE QN: 2.51 KU/L
RAST CLASS: ABNORMAL
RAST CLASS: NORMAL
SALMON IGE QN: 1.05 KU/L
STRAWBERRY IGE QN: <0.1 KU/L
TIMOTHY IGE QN: 1.93 KU/L
TUNA IGE QN: 1.01 KU/L
WALNUT IGE QN: <0.1 KU/L
WEST RAGWEED IGE QN: 1.47 KU/L
WHITE OAK IGE QN: 2.19 KU/L

## 2025-02-28 LAB — Lab: NORMAL KU/L
